# Patient Record
Sex: FEMALE | Race: WHITE | NOT HISPANIC OR LATINO | Employment: FULL TIME | URBAN - METROPOLITAN AREA
[De-identification: names, ages, dates, MRNs, and addresses within clinical notes are randomized per-mention and may not be internally consistent; named-entity substitution may affect disease eponyms.]

---

## 2023-07-12 DIAGNOSIS — Z00.00 ANNUAL PHYSICAL EXAM: ICD-10-CM

## 2023-07-12 DIAGNOSIS — Z12.31 VISIT FOR SCREENING MAMMOGRAM: ICD-10-CM

## 2023-07-12 DIAGNOSIS — R79.0 LOW IRON STORES: ICD-10-CM

## 2023-07-12 ASSESSMENT — PROMIS GLOBAL HEALTH SCALE
RATE_PHYSICAL_HEALTH: VERY GOOD
RATE_AVERAGE_FATIGUE: MODERATE
RATE_GENERAL_HEALTH: VERY GOOD
RATE_SOCIAL_SATISFACTION: VERY GOOD
RATE_QUALITY_OF_LIFE: VERY GOOD
EMOTIONAL_PROBLEMS: SOMETIMES
RATE_MENTAL_HEALTH: VERY GOOD
CARRYOUT_PHYSICAL_ACTIVITIES: COMPLETELY
CARRYOUT_SOCIAL_ACTIVITIES: VERY GOOD
RATE_AVERAGE_PAIN: 0

## 2023-07-13 ENCOUNTER — LAB (OUTPATIENT)
Dept: LAB | Facility: LAB | Age: 48
End: 2023-07-13
Payer: COMMERCIAL

## 2023-07-13 DIAGNOSIS — Z00.00 ANNUAL PHYSICAL EXAM: ICD-10-CM

## 2023-07-13 DIAGNOSIS — R79.0 LOW IRON STORES: ICD-10-CM

## 2023-07-13 LAB
ALANINE AMINOTRANSFERASE (SGPT) (U/L) IN SER/PLAS: 15 U/L (ref 7–45)
ALBUMIN (G/DL) IN SER/PLAS: 4.4 G/DL (ref 3.4–5)
ALKALINE PHOSPHATASE (U/L) IN SER/PLAS: 93 U/L (ref 33–110)
ANION GAP IN SER/PLAS: 15 MMOL/L (ref 10–20)
ASPARTATE AMINOTRANSFERASE (SGOT) (U/L) IN SER/PLAS: 14 U/L (ref 9–39)
BASOPHILS (10*3/UL) IN BLOOD BY AUTOMATED COUNT: 0.05 X10E9/L (ref 0–0.1)
BASOPHILS/100 LEUKOCYTES IN BLOOD BY AUTOMATED COUNT: 0.6 % (ref 0–2)
BILIRUBIN TOTAL (MG/DL) IN SER/PLAS: 0.6 MG/DL (ref 0–1.2)
CALCIUM (MG/DL) IN SER/PLAS: 10.1 MG/DL (ref 8.6–10.6)
CARBON DIOXIDE, TOTAL (MMOL/L) IN SER/PLAS: 28 MMOL/L (ref 21–32)
CHLORIDE (MMOL/L) IN SER/PLAS: 101 MMOL/L (ref 98–107)
CHOLESTEROL (MG/DL) IN SER/PLAS: 194 MG/DL (ref 0–199)
CHOLESTEROL IN HDL (MG/DL) IN SER/PLAS: 52.5 MG/DL
CHOLESTEROL/HDL RATIO: 3.7
CREATININE (MG/DL) IN SER/PLAS: 0.72 MG/DL (ref 0.5–1.05)
EOSINOPHILS (10*3/UL) IN BLOOD BY AUTOMATED COUNT: 0.11 X10E9/L (ref 0–0.7)
EOSINOPHILS/100 LEUKOCYTES IN BLOOD BY AUTOMATED COUNT: 1.4 % (ref 0–6)
ERYTHROCYTE DISTRIBUTION WIDTH (RATIO) BY AUTOMATED COUNT: 13.6 % (ref 11.5–14.5)
ERYTHROCYTE MEAN CORPUSCULAR HEMOGLOBIN CONCENTRATION (G/DL) BY AUTOMATED: 31.6 G/DL (ref 32–36)
ERYTHROCYTE MEAN CORPUSCULAR VOLUME (FL) BY AUTOMATED COUNT: 88 FL (ref 80–100)
ERYTHROCYTES (10*6/UL) IN BLOOD BY AUTOMATED COUNT: 4.68 X10E12/L (ref 4–5.2)
FERRITIN (UG/LL) IN SER/PLAS: 76 UG/L (ref 8–150)
GFR FEMALE: >90 ML/MIN/1.73M2
GLUCOSE (MG/DL) IN SER/PLAS: 110 MG/DL (ref 74–99)
HEMATOCRIT (%) IN BLOOD BY AUTOMATED COUNT: 41.4 % (ref 36–46)
HEMOGLOBIN (G/DL) IN BLOOD: 13.1 G/DL (ref 12–16)
IMMATURE GRANULOCYTES/100 LEUKOCYTES IN BLOOD BY AUTOMATED COUNT: 0.3 % (ref 0–0.9)
IRON (UG/DL) IN SER/PLAS: 104 UG/DL (ref 35–150)
IRON BINDING CAPACITY (UG/DL) IN SER/PLAS: 349 UG/DL (ref 240–445)
IRON SATURATION (%) IN SER/PLAS: 30 % (ref 25–45)
LDL: 107 MG/DL (ref 0–99)
LEUKOCYTES (10*3/UL) IN BLOOD BY AUTOMATED COUNT: 7.9 X10E9/L (ref 4.4–11.3)
LYMPHOCYTES (10*3/UL) IN BLOOD BY AUTOMATED COUNT: 2.55 X10E9/L (ref 1.2–4.8)
LYMPHOCYTES/100 LEUKOCYTES IN BLOOD BY AUTOMATED COUNT: 32.4 % (ref 13–44)
MONOCYTES (10*3/UL) IN BLOOD BY AUTOMATED COUNT: 0.62 X10E9/L (ref 0.1–1)
MONOCYTES/100 LEUKOCYTES IN BLOOD BY AUTOMATED COUNT: 7.9 % (ref 2–10)
NEUTROPHILS (10*3/UL) IN BLOOD BY AUTOMATED COUNT: 4.52 X10E9/L (ref 1.2–7.7)
NEUTROPHILS/100 LEUKOCYTES IN BLOOD BY AUTOMATED COUNT: 57.4 % (ref 40–80)
NRBC (PER 100 WBCS) BY AUTOMATED COUNT: 0 /100 WBC (ref 0–0)
PLATELETS (10*3/UL) IN BLOOD AUTOMATED COUNT: 281 X10E9/L (ref 150–450)
POTASSIUM (MMOL/L) IN SER/PLAS: 3.9 MMOL/L (ref 3.5–5.3)
PROTEIN TOTAL: 7 G/DL (ref 6.4–8.2)
SODIUM (MMOL/L) IN SER/PLAS: 140 MMOL/L (ref 136–145)
THYROTROPIN (MIU/L) IN SER/PLAS BY DETECTION LIMIT <= 0.05 MIU/L: 1.64 MIU/L (ref 0.44–3.98)
TRIGLYCERIDE (MG/DL) IN SER/PLAS: 175 MG/DL (ref 0–149)
UREA NITROGEN (MG/DL) IN SER/PLAS: 10 MG/DL (ref 6–23)
VLDL: 35 MG/DL (ref 0–40)

## 2023-07-13 PROCEDURE — 36415 COLL VENOUS BLD VENIPUNCTURE: CPT

## 2023-07-13 PROCEDURE — 83540 ASSAY OF IRON: CPT

## 2023-07-13 PROCEDURE — 83550 IRON BINDING TEST: CPT

## 2023-07-13 PROCEDURE — 84443 ASSAY THYROID STIM HORMONE: CPT

## 2023-07-13 PROCEDURE — 82728 ASSAY OF FERRITIN: CPT

## 2023-07-13 PROCEDURE — 80053 COMPREHEN METABOLIC PANEL: CPT

## 2023-07-13 PROCEDURE — 80061 LIPID PANEL: CPT

## 2023-07-13 PROCEDURE — 85025 COMPLETE CBC W/AUTO DIFF WBC: CPT

## 2023-07-19 ENCOUNTER — OFFICE VISIT (OUTPATIENT)
Dept: PRIMARY CARE | Facility: CLINIC | Age: 48
End: 2023-07-19
Payer: COMMERCIAL

## 2023-07-19 VITALS
DIASTOLIC BLOOD PRESSURE: 70 MMHG | TEMPERATURE: 97.2 F | HEART RATE: 68 BPM | BODY MASS INDEX: 24.98 KG/M2 | RESPIRATION RATE: 16 BRPM | HEIGHT: 63 IN | SYSTOLIC BLOOD PRESSURE: 120 MMHG | WEIGHT: 141 LBS

## 2023-07-19 DIAGNOSIS — E78.5 DYSLIPIDEMIA: ICD-10-CM

## 2023-07-19 DIAGNOSIS — R21 RASH: ICD-10-CM

## 2023-07-19 DIAGNOSIS — Z00.00 ROUTINE GENERAL MEDICAL EXAMINATION AT A HEALTH CARE FACILITY: Primary | ICD-10-CM

## 2023-07-19 DIAGNOSIS — F41.9 ANXIETY: ICD-10-CM

## 2023-07-19 PROBLEM — N95.1 MENOPAUSAL SYMPTOMS: Status: ACTIVE | Noted: 2023-07-19

## 2023-07-19 PROCEDURE — 99396 PREV VISIT EST AGE 40-64: CPT | Performed by: INTERNAL MEDICINE

## 2023-07-19 PROCEDURE — 1036F TOBACCO NON-USER: CPT | Performed by: INTERNAL MEDICINE

## 2023-07-19 RX ORDER — PANTOPRAZOLE SODIUM 40 MG/1
40 TABLET, DELAYED RELEASE ORAL
COMMUNITY
Start: 2022-08-18

## 2023-07-19 RX ORDER — MOMETASONE FUROATE 1 MG/G
OINTMENT TOPICAL DAILY
Qty: 45 G | Refills: 0 | Status: SHIPPED | OUTPATIENT
Start: 2023-07-19 | End: 2024-01-19 | Stop reason: ALTCHOICE

## 2023-07-19 RX ORDER — ALPRAZOLAM 0.25 MG/1
TABLET ORAL
Qty: 6 TABLET | Refills: 0 | Status: SHIPPED | OUTPATIENT
Start: 2023-07-19 | End: 2024-03-07 | Stop reason: SDUPTHER

## 2023-07-19 RX ORDER — PAROXETINE 7.5 MG/1
7.5 CAPSULE ORAL
COMMUNITY
Start: 2023-06-23 | End: 2024-06-22

## 2023-07-19 ASSESSMENT — ENCOUNTER SYMPTOMS: DEPRESSION: 0

## 2023-07-19 ASSESSMENT — COLUMBIA-SUICIDE SEVERITY RATING SCALE - C-SSRS
1. IN THE PAST MONTH, HAVE YOU WISHED YOU WERE DEAD OR WISHED YOU COULD GO TO SLEEP AND NOT WAKE UP?: NO
2. HAVE YOU ACTUALLY HAD ANY THOUGHTS OF KILLING YOURSELF?: NO
6. HAVE YOU EVER DONE ANYTHING, STARTED TO DO ANYTHING, OR PREPARED TO DO ANYTHING TO END YOUR LIFE?: NO

## 2023-07-19 ASSESSMENT — PATIENT HEALTH QUESTIONNAIRE - PHQ9
1. LITTLE INTEREST OR PLEASURE IN DOING THINGS: NOT AT ALL
SUM OF ALL RESPONSES TO PHQ9 QUESTIONS 1 AND 2: 0
2. FEELING DOWN, DEPRESSED OR HOPELESS: NOT AT ALL

## 2023-07-19 NOTE — PROGRESS NOTES
Subjective   Patient ID: Elizabeth Coto is a 48 y.o. female who presents for Annual Exam (BW results ).  HPI  Patient is here for annual check-up    Last well check 1 yr    Reported health [facility]    Dental  check  reg     Vision check reg   Vision issues contact/glasses  Hearing issues n  Vaccines UTD  y    Diet healthy but could improve  is vegetarian  Exercise less now usually 3 x  Caffeine  1 c   Alcohol occ  Tobacco never     Colon cancer screening  colonoscopy due in 8/2023  Prev had upper and lower for low iron    Current issues: Patient is here for annual checkup.  She has been feeling well.  Her biggest concern is heart disease risk factors as her dad had several MIs as well as carotid stenosis and stroke.  He passed away in his 60s.  He is initially started having heart issues in his early 30s.  Patient is a non-smoker she had recent labs to go over    She also has concerns about hormonal symptoms she was given a prescription for Brisdelle and she wanted to know about that if I thought that was a good idea      She has a rash on her abdomen for about 4 to 5 days it is very itchy and a little bit blistery there is no pain  Review of Systems  GENERAL - Denies fever, fatigue or chills  SKIN -see above   EYES - Denies blurred vision, or change in visual acuity  EARS - Denies ear pain, discharge, ringing, or difficulty hearing  NOSE - Denies nasal congestion, discharge, or bleeding  MOUTH - Denies sore throat, postnasal drip or painful/difficulty swallowing  NECK - Denies pain or swelling  RESPIRATORY - Denies shortness of breath, cough, wheezing  CARDIOVASCULAR - Denies palpitations, chest pain, orthopnea, peripheral edema, syncope or claudication  GASTROINTESTINAL - Denies nausea, vomiting, diarrhea, constipation, abdominal pain, melena and or bright red blood  GENITOURINARY - Denies dysuria, frequency of urination, urgency, or hesitancy  MUSCULOSKELETAL - Denies joint or muscle pain, or back pain  NEUROLOGICAL -  "Denies localized numbness, weakness, or tingling  PSYCHIATRIC - Denies depression, anxiety, substance abuse, suicidal or homicidal ideation  ENDOCRINE - Denies heat or cold intolerance, weight loss or gain, increasing thirst  HEMATO-IMMUNOLOGIC - Denies easy bruising, bleeding, oral ulcerations or recurrent infections    Objective   /70   Pulse 68   Temp 36.2 °C (97.2 °F)   Resp 16   Ht 1.6 m (5' 3\")   Wt 64 kg (141 lb)   BMI 24.98 kg/m²     Physical Exam  CONSTITUTIONAL - well nourished, well developed, looks like stated age, in no acute distress, not ill-appearing, and not tired appearing  SKIN -she has a red circular lesion with some internal blisters on her mid abdomen upper aspect   HEAD - no trauma, normocephalic  EYES -normal  ENT - TM's intact, no injection, no exudate,  nasal passage without discharge and patent  NECK - supple without rigidity, no neck mass was observed, no thyromegaly or thyroid nodules  CHEST - clear to auscultation, no wheezing, no crackles and no rales, good effort  CARDIAC - regular rate and regular rhythm, no skipped beats, no murmur  ABDOMEN - no organomegaly, soft, nontender, nondistended, normal bowel sounds, no guarding/rebound/rigidity  EXTREMITIES - no edema, no deformities  NEUROLOGICAL -normal  PSYCHIATRIC - alert, pleasant and cordial, age-appropriate  LYMPHATIC- no cervical lymphadenopathy    Assessment/Plan   Diagnoses and all orders for this visit:  Routine general medical examination at a health care facility  Dyslipidemia  -     Comprehensive Metabolic Panel; Future  -     Lipid Panel; Future  Anxiety  -     ALPRAZolam (Xanax) 0.25 mg tablet; 1 to 2 tab po  1 hour before flight  Rash  -     mometasone (Elocon) 0.1 % ointment; Apply topically once daily for 21 days. apply to affected area    Call with issues  Follow-up with me in 6 months recheck blood work at that time  Patient to work on diet and exercise  The rash I suspect may be early poison ivy after " mometasone prescription she is instructed to call if she develops any pain  Lorena Tate MD

## 2024-01-12 ENCOUNTER — LAB (OUTPATIENT)
Dept: LAB | Facility: LAB | Age: 49
End: 2024-01-12
Payer: COMMERCIAL

## 2024-01-12 DIAGNOSIS — E78.5 DYSLIPIDEMIA: ICD-10-CM

## 2024-01-12 LAB
ALBUMIN SERPL BCP-MCNC: 4.5 G/DL (ref 3.4–5)
ALP SERPL-CCNC: 95 U/L (ref 33–110)
ALT SERPL W P-5'-P-CCNC: 25 U/L (ref 7–45)
ANION GAP SERPL CALC-SCNC: 15 MMOL/L (ref 10–20)
AST SERPL W P-5'-P-CCNC: 15 U/L (ref 9–39)
BILIRUB SERPL-MCNC: 0.4 MG/DL (ref 0–1.2)
BUN SERPL-MCNC: 11 MG/DL (ref 6–23)
CALCIUM SERPL-MCNC: 10.1 MG/DL (ref 8.6–10.6)
CHLORIDE SERPL-SCNC: 104 MMOL/L (ref 98–107)
CHOLEST SERPL-MCNC: 185 MG/DL (ref 0–199)
CHOLESTEROL/HDL RATIO: 3.9
CO2 SERPL-SCNC: 29 MMOL/L (ref 21–32)
CREAT SERPL-MCNC: 0.76 MG/DL (ref 0.5–1.05)
EGFRCR SERPLBLD CKD-EPI 2021: >90 ML/MIN/1.73M*2
GLUCOSE SERPL-MCNC: 121 MG/DL (ref 74–99)
HDLC SERPL-MCNC: 47 MG/DL
LDLC SERPL CALC-MCNC: 111 MG/DL
NON HDL CHOLESTEROL: 138 MG/DL (ref 0–149)
POTASSIUM SERPL-SCNC: 4.7 MMOL/L (ref 3.5–5.3)
PROT SERPL-MCNC: 7 G/DL (ref 6.4–8.2)
SODIUM SERPL-SCNC: 143 MMOL/L (ref 136–145)
TRIGL SERPL-MCNC: 136 MG/DL (ref 0–149)
VLDL: 27 MG/DL (ref 0–40)

## 2024-01-12 PROCEDURE — 80061 LIPID PANEL: CPT

## 2024-01-12 PROCEDURE — 36415 COLL VENOUS BLD VENIPUNCTURE: CPT

## 2024-01-12 PROCEDURE — 80053 COMPREHEN METABOLIC PANEL: CPT

## 2024-01-19 ENCOUNTER — OFFICE VISIT (OUTPATIENT)
Dept: PRIMARY CARE | Facility: CLINIC | Age: 49
End: 2024-01-19
Payer: COMMERCIAL

## 2024-01-19 ENCOUNTER — LAB (OUTPATIENT)
Dept: LAB | Facility: LAB | Age: 49
End: 2024-01-19
Payer: COMMERCIAL

## 2024-01-19 VITALS
HEART RATE: 76 BPM | TEMPERATURE: 97.6 F | DIASTOLIC BLOOD PRESSURE: 76 MMHG | HEIGHT: 63 IN | OXYGEN SATURATION: 95 % | SYSTOLIC BLOOD PRESSURE: 116 MMHG | BODY MASS INDEX: 25.16 KG/M2 | WEIGHT: 142 LBS

## 2024-01-19 DIAGNOSIS — R73.01 FASTING HYPERGLYCEMIA: Primary | ICD-10-CM

## 2024-01-19 DIAGNOSIS — R73.01 FASTING HYPERGLYCEMIA: ICD-10-CM

## 2024-01-19 DIAGNOSIS — R73.03 BORDERLINE DIABETES MELLITUS: Primary | ICD-10-CM

## 2024-01-19 DIAGNOSIS — Z00.00 ROUTINE GENERAL MEDICAL EXAMINATION AT A HEALTH CARE FACILITY: ICD-10-CM

## 2024-01-19 LAB
EST. AVERAGE GLUCOSE BLD GHB EST-MCNC: 137 MG/DL
HBA1C MFR BLD: 6.4 %

## 2024-01-19 PROCEDURE — 83036 HEMOGLOBIN GLYCOSYLATED A1C: CPT

## 2024-01-19 PROCEDURE — 99214 OFFICE O/P EST MOD 30 MIN: CPT | Performed by: INTERNAL MEDICINE

## 2024-01-19 PROCEDURE — 1036F TOBACCO NON-USER: CPT | Performed by: INTERNAL MEDICINE

## 2024-01-19 PROCEDURE — 36415 COLL VENOUS BLD VENIPUNCTURE: CPT

## 2024-01-19 RX ORDER — BLOOD SUGAR DIAGNOSTIC
1 STRIP MISCELLANEOUS DAILY
Qty: 100 STRIP | Refills: 1 | Status: SHIPPED | OUTPATIENT
Start: 2024-01-19

## 2024-01-19 RX ORDER — LANCETS
EACH MISCELLANEOUS
Qty: 100 EACH | Refills: 1 | Status: SHIPPED | OUTPATIENT
Start: 2024-01-19

## 2024-01-19 RX ORDER — INSULIN PUMP SYRINGE, 3 ML
EACH MISCELLANEOUS
Qty: 1 EACH | Refills: 0 | Status: SHIPPED | OUTPATIENT
Start: 2024-01-19 | End: 2025-01-18

## 2024-01-19 ASSESSMENT — PATIENT HEALTH QUESTIONNAIRE - PHQ9
SUM OF ALL RESPONSES TO PHQ9 QUESTIONS 1 AND 2: 0
2. FEELING DOWN, DEPRESSED OR HOPELESS: NOT AT ALL
1. LITTLE INTEREST OR PLEASURE IN DOING THINGS: NOT AT ALL

## 2024-01-19 ASSESSMENT — ENCOUNTER SYMPTOMS
DEPRESSION: 0
LOSS OF SENSATION IN FEET: 0
OCCASIONAL FEELINGS OF UNSTEADINESS: 0

## 2024-01-19 NOTE — PROGRESS NOTES
"Subjective   Patient ID: Elizabeth Coto is a 48 y.o. female who presents for Follow-up (EP.  Follow up.  Labs done.).  HPI  Patient presents today for follow-up appointment she had blood work done We have been watching her sugar.  Her sugar is elevated.  She also had cholesterol rechecked her cholesterol is overall good but her triglycerides are high.  She states she knows she needs to work on diet and exercise.  Will get had a hemoglobin A1c on her blood work today.  She continues on pantoprazole for acid reflux as well as Paxil at very low-dose prescribed by her GYN for gynecologic symptoms.  She states that she knows she needs to improve her lifestyle because she has family history of diabetes.    Review of Systems  Review of systems was performed and is otherwise negative except as noted in HPI.  Objective   /76   Pulse 76   Temp 36.4 °C (97.6 °F) (Oral)   Ht 1.6 m (5' 3\")   Wt 64.4 kg (142 lb)   SpO2 95%   BMI 25.15 kg/m²    Physical Exam  HEENT is normal  Lungs clear bilaterally  Heart is regular rate rhythm no murmurs  Abdomen benign  Lower extremities no edema    Assessment/Plan   Diagnoses and all orders for this visit:  Fasting hyperglycemia  -     Hemoglobin A1C; Future  -     Referral to Nutrition Services; Future  Routine general medical examination at a health care facility  -     Follow Up In [facility] - PCP - Health Maintenance; Future  Worsened glycemic control  Acid reflux is under good control  Discussed diet and exercise.  She will need to follow-up with me in 6 months  Referring to nutrition.  We did her hemoglobin A1c.  It resulted before this note was written.  Patient is aware she is contact me.  She is going to defer on metformin for now she is going to start testing her sugars and see nutrition.  She will call me with any issues.  She will follow-up with me in 3 months instead of 6 months.  She is also doing a 6-month follow-up for her annual physical.  Blood work is " entered for her next appointment.  She will call if she has any issues with hyperglycemia.  She will try to follow the recommendations of nutrition  Lorena Tate MD    SBO

## 2024-01-26 ENCOUNTER — NUTRITION (OUTPATIENT)
Dept: NUTRITION | Facility: CLINIC | Age: 49
End: 2024-01-26
Payer: COMMERCIAL

## 2024-01-26 DIAGNOSIS — Z71.3 DIETARY COUNSELING AND SURVEILLANCE: Primary | ICD-10-CM

## 2024-01-26 DIAGNOSIS — R73.01 FASTING HYPERGLYCEMIA: ICD-10-CM

## 2024-01-26 PROCEDURE — 97802 MEDICAL NUTRITION INDIV IN: CPT

## 2024-01-26 NOTE — PROGRESS NOTES
"NUTRITION ASSESSMENT NOTE    Reason for Nutrition Visit:  Pt is a 48 y.o. female being seen in person for an initial appointment at [facility] referred for fasting hyperglycemia.      Pt states they seek  from dietitian for help addressing her high blood sugar levels and learning how to eat healthier in general.     Anthropometrics:  Wt: 142#, 64.4kg  Ht: 5'3\"  BMI: 25.15 kg/m2      Past Medical Hx:  Patient Active Problem List   Diagnosis    Menopausal symptoms     - IBS      Lab Results   Component Value Date    HGBA1C 6.4 (H) 01/19/2024    CHOL 185 01/12/2024    LDLF 107 (H) 07/13/2023    TRIG 136 01/12/2024          Food and Nutrition Hx:  Pt saw a dietitian ~19 years ago when she was pregnant with her son when she also had gestational diabetes. Pt has strong genetic component with regard to blood sugars. Pt wants to get it under control before heading down the pharmaceutical route.     Pt has been a vegetarian since the age of 16 - she cites not being a \"healthy vegetarian,\" but more of a \"lazy vegetarian.\"    Pt feels biggest downfall is the improper spacing of her meals and the lack of meal planning and prepping. Pt is a , part time, and now has the time to manage her meal planning and prepping. Pt does not eat the same food as her , thus she eats differently.     Pt has tried [employer], Hello Fresh, Daily [place], and other meal delivery services. Pt feels like these offerings have too many carbohydrates within them.     DIETARY RECALL: *Pt consumes an average of 3 meals/day*  Meal 1: Everyday, ~10am, [place]ita biscuits  Meal 2: Everyday, varied timing ~~1pm-3pm, Peanut and jelly sandwich, pre-packaged salads, snacks (cheese stick and crackers, red peppers)  Meal 3: Everyday, varied timing (when hungry), Pasta, veggie burgers, tofu (tacos, stir gaming), vegetables (corn, potatoes, starchy varieties), brussels sprouts  Snacks: Nuts, trail mix  Beverages: Water x 32oz+ daily, Diet " Dr. Pepper x 1 daily      NUTRITIONAL ARTIFACTS:  Trying new restaurants to try new vegetarian dishes to help create at home -- not opposed to trying fish -- avoids dairy products d/t intolerance -- does not drink much water early in the day d/t work confines    Allergies: None  Intolerance: Lactose    Supplements: Multivitamin, Vitamin D, Calcium, and Iron daily    Energy Levels: Stable    Food Preparation: Patient  Cooking Skills/Barriers: Limited cooking skills and Low preference for cooking     Nutrition Focused Physical Exam:    Performed/Deferred: Deferred as pt visually appears well-nourished with no signs of malnutrition      Estimated Energy Needs:    WEIGHT MAINTENANCE: 30 kcal/kg CBW = 1900 kcal/day  WEIGHT LOSS: 22-25 kcal/kg CBW = 1142-4798 kcal/day  PROTEIN Needs: 0.8-1 g/kg = 50-65 g/day    Nutrition Diagnosis:    Diagnosis Statement 1:  Diagnosis Status: New  Diagnosis : Altered nutrition related lab values  related to  endocrine and/or metabolic dysfunction  as evidenced by  patient HbA1C of 6.4% and LDL of 111 mg/dL    Diagnosis Statement 2:  Diagnosis Status: New  Diagnosis : Food and nutrition related knowledge deficit related to lack of or limited prior nutrition-related education as evidenced by verbalizes inaccurate or incomplete knowledge    Diagnosis Statement 3:   Diagnosis Status: New  Diagnosis : Inadequate protein intake  related to food and nutrition related knowledge deficit concerning appropriate amount and type of dietary fat and/or protein as evidenced by  estimated daily intake of protein at levels <75% daily recommended needs    Nutrition Interventions:  Anti-Inflammatory Diet, Consistent Carbohydrate Diet, Decreased Carbohydrate Diet, Increased Fiber Diet, Increased Fluid Intake, Increased Soluble Fiber, Increased Protein Diet, Mediterranean Diet, and Plant Based Diet  Nutrition Counseling: Motivational Interviewing and Health Belief Model  Coordination of Care:  None      Nutrition Recommendations:  1) Cut current volume of starchy and refined carbohydrates down to portioned levels discussed during appointment. Remember, carbohydrates are essential to providing the body with energy and nutrients and they should not be avoided with diabetes or other conditions. It becomes extremely important to manage the portion control/volume of such carbohydrates, and the timing of eating them. Breakfast should be consumed within 1-2 hours of waking, lunch 4-5 hours after breakfast, and dinner 4-5 hours after lunch. A healthy and balanced snack can be eaten in the evening, and one snack can be eaten between one of your meals.  Eating consistently, and with measured portions, carbohydrates will help manage blood sugar levels and help reduce excessive hunger in the evenings.   2) Consume balanced meals - breakfast, lunch, dinner - to promote management of blood sugar levels, management of metabolism, and promote satiety. Use the My Plate handout from the appointment as a tool to ensure that each meal, and snack, contains fiber-rich carbohydrates, lean proteins, and fibrous vegetables. When combined, these types of foods can help slow down digestion and absorption, thereby helping manage excessive rises in blood sugar levels.  3) Begin adding breakfast into your daily routine. Establishing this meal will help lay the foundation for eating three meals a day, something that will help manage your blood sugar and reduce your A1C level. Remember, it is about consistency and control, especially for portions of carbohydrates.  4) Increase fibrous vegetable intake. Women are recommended to consume at least 25 grams of fiber daily. Plants, such as vegetables, fruits, whole grains, legumes, nuts, and seeds will help reduce inflammation in your GI tract, help repair and restore lining and integrity of GI tract, manage blood sugar levels, reduce cholesterol and lipid levels, enhance metabolic  "functioning, improve weight status, and promote satiety. Fiber from plants does not count as calories, and is considered extremely healthful.  5) Begin building and incorporating \"Nourish Bowls\" into your dietary patterning. This powerful phytonutrient bowl helps deliver an exceptional array of phytonutrients, vitamins, minerals, lean protein, fiber-rich carbohydrates, heart healthy fats, and immune boosting antioxidants. You can eat the nourish bowls for lunch as an excellent way to promote nutrient intake midday, or you can eat them in the evening to promote lasting satiety that prevents overconsumption of snacks or other foods at night.    Educational Handouts: ADA My Plate, PBP,     Readiness to Change : Excellent  Level of Understanding : Excellent  Anticipated Compliant : Excellent    "

## 2024-03-07 DIAGNOSIS — F41.9 ANXIETY: ICD-10-CM

## 2024-03-07 RX ORDER — ALPRAZOLAM 0.25 MG/1
TABLET ORAL
Qty: 6 TABLET | Refills: 0 | Status: SHIPPED | OUTPATIENT
Start: 2024-03-07

## 2024-03-07 NOTE — TELEPHONE ENCOUNTER
----- Message from Elizabeth Coto sent at 3/6/2024  9:37 PM EST -----  Regarding: Medication refill   Contact: [phone]  Hello- I would like to request a refill on my prescription for Xanax (alprazolam .25)  It was prescribed last summer to help with my fear of flying. (& helped tremendously) I  have 2 trips coming up (domestic) & was hoping for help.   Thank you!

## 2024-03-07 NOTE — TELEPHONE ENCOUNTER
Patient called requesting a new rx for the alprazolam, she stated it helped tremendously when she is flying and she has two trips coming up.     Last appt 01/19/24  Next appt 04/26/24

## 2024-04-19 ENCOUNTER — LAB (OUTPATIENT)
Dept: LAB | Facility: LAB | Age: 49
End: 2024-04-19
Payer: COMMERCIAL

## 2024-04-19 DIAGNOSIS — R73.03 BORDERLINE DIABETES MELLITUS: ICD-10-CM

## 2024-04-19 LAB
EST. AVERAGE GLUCOSE BLD GHB EST-MCNC: 123 MG/DL
HBA1C MFR BLD: 5.9 %

## 2024-04-19 PROCEDURE — 80061 LIPID PANEL: CPT

## 2024-04-19 PROCEDURE — 36415 COLL VENOUS BLD VENIPUNCTURE: CPT

## 2024-04-19 PROCEDURE — 80053 COMPREHEN METABOLIC PANEL: CPT

## 2024-04-19 PROCEDURE — 83036 HEMOGLOBIN GLYCOSYLATED A1C: CPT

## 2024-04-20 LAB
ALBUMIN SERPL BCP-MCNC: 4.3 G/DL (ref 3.4–5)
ALP SERPL-CCNC: 91 U/L (ref 33–110)
ALT SERPL W P-5'-P-CCNC: 12 U/L (ref 7–45)
ANION GAP SERPL CALC-SCNC: 12 MMOL/L (ref 10–20)
AST SERPL W P-5'-P-CCNC: 11 U/L (ref 9–39)
BILIRUB SERPL-MCNC: 0.4 MG/DL (ref 0–1.2)
BUN SERPL-MCNC: 9 MG/DL (ref 6–23)
CALCIUM SERPL-MCNC: 9.8 MG/DL (ref 8.6–10.6)
CHLORIDE SERPL-SCNC: 103 MMOL/L (ref 98–107)
CHOLEST SERPL-MCNC: 167 MG/DL (ref 0–199)
CHOLESTEROL/HDL RATIO: 3.1
CO2 SERPL-SCNC: 29 MMOL/L (ref 21–32)
CREAT SERPL-MCNC: 0.76 MG/DL (ref 0.5–1.05)
EGFRCR SERPLBLD CKD-EPI 2021: >90 ML/MIN/1.73M*2
GLUCOSE SERPL-MCNC: 106 MG/DL (ref 74–99)
HDLC SERPL-MCNC: 53.5 MG/DL
LDLC SERPL CALC-MCNC: 95 MG/DL
NON HDL CHOLESTEROL: 114 MG/DL (ref 0–149)
POTASSIUM SERPL-SCNC: 4.1 MMOL/L (ref 3.5–5.3)
PROT SERPL-MCNC: 6.9 G/DL (ref 6.4–8.2)
SODIUM SERPL-SCNC: 140 MMOL/L (ref 136–145)
TRIGL SERPL-MCNC: 94 MG/DL (ref 0–149)
VLDL: 19 MG/DL (ref 0–40)

## 2024-04-26 ENCOUNTER — OFFICE VISIT (OUTPATIENT)
Dept: PRIMARY CARE | Facility: CLINIC | Age: 49
End: 2024-04-26
Payer: COMMERCIAL

## 2024-04-26 VITALS
OXYGEN SATURATION: 96 % | HEIGHT: 63 IN | BODY MASS INDEX: 21.79 KG/M2 | TEMPERATURE: 97.9 F | SYSTOLIC BLOOD PRESSURE: 114 MMHG | DIASTOLIC BLOOD PRESSURE: 74 MMHG | HEART RATE: 65 BPM | WEIGHT: 123 LBS

## 2024-04-26 DIAGNOSIS — R73.01 FASTING HYPERGLYCEMIA: Primary | ICD-10-CM

## 2024-04-26 DIAGNOSIS — Z00.00 ROUTINE GENERAL MEDICAL EXAMINATION AT A HEALTH CARE FACILITY: ICD-10-CM

## 2024-04-26 PROCEDURE — 99213 OFFICE O/P EST LOW 20 MIN: CPT | Performed by: INTERNAL MEDICINE

## 2024-04-26 PROCEDURE — 1036F TOBACCO NON-USER: CPT | Performed by: INTERNAL MEDICINE

## 2024-04-26 ASSESSMENT — ENCOUNTER SYMPTOMS
OCCASIONAL FEELINGS OF UNSTEADINESS: 0
LOSS OF SENSATION IN FEET: 0
DEPRESSION: 0

## 2024-04-26 ASSESSMENT — PATIENT HEALTH QUESTIONNAIRE - PHQ9
2. FEELING DOWN, DEPRESSED OR HOPELESS: NOT AT ALL
1. LITTLE INTEREST OR PLEASURE IN DOING THINGS: NOT AT ALL
SUM OF ALL RESPONSES TO PHQ9 QUESTIONS 1 AND 2: 0

## 2024-04-26 NOTE — PROGRESS NOTES
"Subjective   Patient ID: Elizabeth Coto is a 48 y.o. female who presents for Follow-up (EP.  Follow up.  Labs done.  No concerns.).  HPI  Here for  sugar   Had bw and numbers were better    Would like to touch base w nutrition  again   She is pleased with the progress of her sugar.  She feels like she would like retesting nutrition now that she is work on it for about 3 months I want a more specific ideas about carb counting.  She is exercising regularly.  She is feeling well.  She is pleased with her numbers.  She continues on her other medications as noted.  She has no other issues.    Patient denies excessive urination thirst or weight loss.  She has had no weight gain.  She has no chest pains headaches dizziness lightheadedness or shortness of breath and denies lower extremity edema      Review of Systems  Review of systems was performed and is otherwise negative except as noted in HPI.  Objective   /74   Pulse 65   Temp 36.6 °C (97.9 °F) (Oral)   Ht 1.6 m (5' 3\")   Wt 55.8 kg (123 lb)   SpO2 96%   BMI 21.79 kg/m²    Physical Exam  HEENT is normal  Lungs clear bilaterally  Heart is regular rate rhythm no murmurs  Abdomen benign  Lower extremities no edema    Assessment/Plan   Diagnoses and all orders for this visit:  Fasting hyperglycemia  -     Referral to Nutrition Services; Future  Routine general medical examination at a health care facility  -     CBC and Auto Differential; Future  -     Comprehensive Metabolic Panel; Future  -     Lipid Panel; Future  -     TSH with reflex to Free T4 if abnormal; Future  -     Hemoglobin A1C; Future  -     Iron and TIBC; Future  -     Ferritin; Future  -     Vitamin D 25-Hydroxy,Total (for eval of Vitamin D levels); Future    Call with issues  Blood work ordered  Follow-up with me in 3 months for her annual physical  Referred back to nutrition  She will call with issues  Lorena Tate MD   "

## 2024-07-19 ENCOUNTER — LAB (OUTPATIENT)
Dept: LAB | Facility: LAB | Age: 49
End: 2024-07-19
Payer: COMMERCIAL

## 2024-07-19 DIAGNOSIS — Z00.00 ROUTINE GENERAL MEDICAL EXAMINATION AT A HEALTH CARE FACILITY: ICD-10-CM

## 2024-07-19 LAB
25(OH)D3 SERPL-MCNC: 61 NG/ML (ref 30–100)
ALBUMIN SERPL BCP-MCNC: 4.4 G/DL (ref 3.4–5)
ALP SERPL-CCNC: 80 U/L (ref 33–110)
ALT SERPL W P-5'-P-CCNC: 14 U/L (ref 7–45)
ANION GAP SERPL CALC-SCNC: 15 MMOL/L (ref 10–20)
AST SERPL W P-5'-P-CCNC: 16 U/L (ref 9–39)
BASOPHILS # BLD AUTO: 0.06 X10*3/UL (ref 0–0.1)
BASOPHILS NFR BLD AUTO: 0.8 %
BILIRUB SERPL-MCNC: 0.6 MG/DL (ref 0–1.2)
BUN SERPL-MCNC: 12 MG/DL (ref 6–23)
CALCIUM SERPL-MCNC: 9.8 MG/DL (ref 8.6–10.6)
CHLORIDE SERPL-SCNC: 102 MMOL/L (ref 98–107)
CHOLEST SERPL-MCNC: 172 MG/DL (ref 0–199)
CHOLESTEROL/HDL RATIO: 2.7
CO2 SERPL-SCNC: 28 MMOL/L (ref 21–32)
CREAT SERPL-MCNC: 0.76 MG/DL (ref 0.5–1.05)
EGFRCR SERPLBLD CKD-EPI 2021: >90 ML/MIN/1.73M*2
EOSINOPHIL # BLD AUTO: 0.06 X10*3/UL (ref 0–0.7)
EOSINOPHIL NFR BLD AUTO: 0.8 %
ERYTHROCYTE [DISTWIDTH] IN BLOOD BY AUTOMATED COUNT: 14.1 % (ref 11.5–14.5)
EST. AVERAGE GLUCOSE BLD GHB EST-MCNC: 134 MG/DL
FERRITIN SERPL-MCNC: 73 NG/ML (ref 8–150)
GLUCOSE SERPL-MCNC: 104 MG/DL (ref 74–99)
HBA1C MFR BLD: 6.3 %
HCT VFR BLD AUTO: 44.2 % (ref 36–46)
HDLC SERPL-MCNC: 62.6 MG/DL
HGB BLD-MCNC: 14 G/DL (ref 12–16)
IMM GRANULOCYTES # BLD AUTO: 0.02 X10*3/UL (ref 0–0.7)
IMM GRANULOCYTES NFR BLD AUTO: 0.3 % (ref 0–0.9)
IRON SATN MFR SERPL: 25 % (ref 25–45)
IRON SERPL-MCNC: 87 UG/DL (ref 35–150)
LDLC SERPL CALC-MCNC: 95 MG/DL
LYMPHOCYTES # BLD AUTO: 2.57 X10*3/UL (ref 1.2–4.8)
LYMPHOCYTES NFR BLD AUTO: 33.8 %
MCH RBC QN AUTO: 27.4 PG (ref 26–34)
MCHC RBC AUTO-ENTMCNC: 31.7 G/DL (ref 32–36)
MCV RBC AUTO: 87 FL (ref 80–100)
MONOCYTES # BLD AUTO: 0.5 X10*3/UL (ref 0.1–1)
MONOCYTES NFR BLD AUTO: 6.6 %
NEUTROPHILS # BLD AUTO: 4.4 X10*3/UL (ref 1.2–7.7)
NEUTROPHILS NFR BLD AUTO: 57.7 %
NON HDL CHOLESTEROL: 109 MG/DL (ref 0–149)
NRBC BLD-RTO: 0 /100 WBCS (ref 0–0)
PLATELET # BLD AUTO: 295 X10*3/UL (ref 150–450)
POTASSIUM SERPL-SCNC: 4.1 MMOL/L (ref 3.5–5.3)
PROT SERPL-MCNC: 6.8 G/DL (ref 6.4–8.2)
RBC # BLD AUTO: 5.11 X10*6/UL (ref 4–5.2)
SODIUM SERPL-SCNC: 141 MMOL/L (ref 136–145)
TIBC SERPL-MCNC: 349 UG/DL (ref 240–445)
TRIGL SERPL-MCNC: 70 MG/DL (ref 0–149)
TSH SERPL-ACNC: 1.62 MIU/L (ref 0.44–3.98)
UIBC SERPL-MCNC: 262 UG/DL (ref 110–370)
VLDL: 14 MG/DL (ref 0–40)
WBC # BLD AUTO: 7.6 X10*3/UL (ref 4.4–11.3)

## 2024-07-19 PROCEDURE — 82728 ASSAY OF FERRITIN: CPT

## 2024-07-19 PROCEDURE — 80053 COMPREHEN METABOLIC PANEL: CPT

## 2024-07-19 PROCEDURE — 85025 COMPLETE CBC W/AUTO DIFF WBC: CPT

## 2024-07-19 PROCEDURE — 83550 IRON BINDING TEST: CPT

## 2024-07-19 PROCEDURE — 83540 ASSAY OF IRON: CPT

## 2024-07-19 PROCEDURE — 80061 LIPID PANEL: CPT

## 2024-07-19 PROCEDURE — 83036 HEMOGLOBIN GLYCOSYLATED A1C: CPT

## 2024-07-19 PROCEDURE — 36415 COLL VENOUS BLD VENIPUNCTURE: CPT

## 2024-07-19 PROCEDURE — 82306 VITAMIN D 25 HYDROXY: CPT

## 2024-07-19 PROCEDURE — 84443 ASSAY THYROID STIM HORMONE: CPT

## 2024-07-22 ENCOUNTER — APPOINTMENT (OUTPATIENT)
Dept: PRIMARY CARE | Facility: CLINIC | Age: 49
End: 2024-07-22
Payer: COMMERCIAL

## 2024-07-22 VITALS
HEIGHT: 63 IN | BODY MASS INDEX: 22.68 KG/M2 | SYSTOLIC BLOOD PRESSURE: 112 MMHG | WEIGHT: 128 LBS | HEART RATE: 84 BPM | DIASTOLIC BLOOD PRESSURE: 72 MMHG | TEMPERATURE: 98.3 F | OXYGEN SATURATION: 98 %

## 2024-07-22 DIAGNOSIS — E78.5 DYSLIPIDEMIA: Primary | ICD-10-CM

## 2024-07-22 DIAGNOSIS — R73.01 FASTING HYPERGLYCEMIA: ICD-10-CM

## 2024-07-22 PROCEDURE — 1036F TOBACCO NON-USER: CPT | Performed by: INTERNAL MEDICINE

## 2024-07-22 PROCEDURE — 3008F BODY MASS INDEX DOCD: CPT | Performed by: INTERNAL MEDICINE

## 2024-07-22 PROCEDURE — 99396 PREV VISIT EST AGE 40-64: CPT | Performed by: INTERNAL MEDICINE

## 2024-07-22 RX ORDER — METFORMIN HYDROCHLORIDE 500 MG/1
500 TABLET, EXTENDED RELEASE ORAL
Qty: 90 TABLET | Refills: 0 | Status: SHIPPED | OUTPATIENT
Start: 2024-07-22 | End: 2024-10-20

## 2024-07-22 ASSESSMENT — ENCOUNTER SYMPTOMS
OCCASIONAL FEELINGS OF UNSTEADINESS: 0
DEPRESSION: 0
LOSS OF SENSATION IN FEET: 0

## 2024-07-22 NOTE — PROGRESS NOTES
"Subjective   Patient ID: Elizabeth Coto is a 49 y.o. female who presents for Annual Exam (EP.  Annual exam with no pap or breast exam.  Labs done.  No concerns.).  HPI  Patient is here for annual check-up    Last well check 1 yr    Reported health good    Dental  check  reg     Vision check reg   Vision issues n  Hearing issues n  Vaccines UTD  y  Sees Gyn gets mammo order had ablation   Diet healthy  Exercise reg  Caffeine 1 d  Alcohol social  Tobacco n    Colon cancer screening  2023    Current issues: has slacked off on daily routine and sugar is up  has strong fmh cad and dm       Review of Systems  GENERAL - Denies fever, fatigue or chills  SKIN - Denies rash, new skin lesions, or change in moles  EYES - Denies blurred vision, or change in visual acuity  EARS - Denies ear pain, discharge, ringing, or difficulty hearing  NOSE - Denies nasal congestion, discharge, or bleeding  MOUTH - Denies sore throat, postnasal drip or painful/difficulty swallowing  NECK - Denies pain or swelling  RESPIRATORY - Denies shortness of breath, cough, wheezing  CARDIOVASCULAR - Denies palpitations, chest pain, orthopnea, peripheral edema, syncope or claudication  GASTROINTESTINAL - Denies nausea, vomiting, diarrhea, constipation, abdominal pain, melena and or bright red blood  GENITOURINARY - Denies dysuria, frequency of urination, urgency, or hesitancy  MUSCULOSKELETAL - Denies joint or muscle pain, or back pain  NEUROLOGICAL - Denies localized numbness, weakness, or tingling  PSYCHIATRIC - Denies depression, anxiety, substance abuse, suicidal or homicidal ideation  ENDOCRINE - Denies heat or cold intolerance, weight loss or gain, increasing thirst  HEMATO-IMMUNOLOGIC - Denies easy bruising, bleeding, oral ulcerations or recurrent infections     Objective   /72   Pulse 84   Temp 36.8 °C (98.3 °F) (Oral)   Ht 1.6 m (5' 3\")   Wt 58.1 kg (128 lb)   SpO2 98%   BMI 22.67 kg/m²    Physical Exam  CONSTITUTIONAL - well " nourished, well developed, looks like stated age, in no acute distress, not ill-appearing, and not tired appearing  SKIN - normal skin color and pigmentation, normal skin turgor without rash, lesions, or nodules visualized  HEAD - no trauma, normocephalic  EYES  - nl b eomi and fundi nl  ENT - TM's intact, no injection, no exudate,  nasal passage without discharge and patent  NECK - supple without rigidity, no neck mass was observed, no thyromegaly or thyroid nodules  CHEST - clear to auscultation, no wheezing, no crackles and no rales, good effort  CARDIAC - regular rate and regular rhythm, no skipped beats, no murmur  ABDOMEN - no organomegaly, soft, nontender, nondistended, normal bowel sounds, no guarding/rebound/rigidity  EXTREMITIES - no edema, no deformities  NEUROLOGICAL -   cn 2 to 12 intact non fical no deficits   PSYCHIATRIC - alert, pleasant and cordial, age-appropriate  LYMPHATIC- no cervical lymphadenopathy    Assessment/Plan   Diagnoses and all orders for this visit:  Dyslipidemia  -     metFORMIN XR (Glucophage-XR) 500 mg 24 hr tablet; Take 1 tablet (500 mg) by mouth once daily with breakfast. Do not crush, chew, or split.  -     Hemoglobin A1C; Future  -     Lipid Panel; Future  -     Comprehensive Metabolic Panel; Future  -     Referral to Nutrition Services; Future  Fasting hyperglycemia  -     metFORMIN XR (Glucophage-XR) 500 mg 24 hr tablet; Take 1 tablet (500 mg) by mouth once daily with breakfast. Do not crush, chew, or split.  -     Hemoglobin A1C; Future  -     Lipid Panel; Future  -     Comprehensive Metabolic Panel; Future  -     Referral to Nutrition Services; Future    Call with issues  Blood work ordered  Refills are sent  Follow-up with me in 3 months  She will start metformin  She will do blood work before next appointment  We have referred to nutrition    Lorena Tate MD

## 2024-09-18 ENCOUNTER — PATIENT MESSAGE (OUTPATIENT)
Dept: PRIMARY CARE | Facility: CLINIC | Age: 49
End: 2024-09-18
Payer: COMMERCIAL

## 2024-09-18 DIAGNOSIS — R73.03 BORDERLINE DIABETES MELLITUS: ICD-10-CM

## 2024-09-18 RX ORDER — LANCETS
EACH MISCELLANEOUS
Qty: 100 EACH | Refills: 1 | Status: SHIPPED | OUTPATIENT
Start: 2024-09-18

## 2024-09-18 RX ORDER — BLOOD SUGAR DIAGNOSTIC
1 STRIP MISCELLANEOUS DAILY
Qty: 100 STRIP | Refills: 1 | Status: SHIPPED | OUTPATIENT
Start: 2024-09-18

## 2024-10-08 ENCOUNTER — NUTRITION (OUTPATIENT)
Dept: NUTRITION | Facility: CLINIC | Age: 49
End: 2024-10-08
Payer: COMMERCIAL

## 2024-10-08 VITALS — WEIGHT: 128.31 LBS | HEIGHT: 63 IN | BODY MASS INDEX: 22.73 KG/M2

## 2024-10-08 DIAGNOSIS — R73.01 FASTING HYPERGLYCEMIA: ICD-10-CM

## 2024-10-08 DIAGNOSIS — E78.5 DYSLIPIDEMIA: ICD-10-CM

## 2024-10-08 PROCEDURE — 97803 MED NUTRITION INDIV SUBSEQ: CPT | Performed by: DIETITIAN, REGISTERED

## 2024-10-08 NOTE — PATIENT INSTRUCTIONS
"1) Aim for three meals and 2 snacks per day. Strive to consume breakfast within 1 -2 hours of waking to jump start the metabolism. Aim for breakfast at 8:00, snack if needed at 10:30, lunch at 1:00, snack at 4:00,  dinner at 6:00 pm and a bedtime snack at 9:00 pm.  2)  Strive to include protein at the meals and even snacks. Protein at meals can increase the metabolism too.  Protein at snacks can sustain energy, stabilize blood glucose, and provide more contentment. Protein foods include eggs, egg whites, cheese, nuts, nut butters, Greek yogurt, fish, tofu, plant-based protein powder, and/or plant-based protein drinks.   3) The plate method was recommended to help portion foods at meals and to structure. Using a 9\" plate, recommended for 1/2 of the plate to include non-starchy vegetable and suggested to consume this first.  Recommended protein to be include of about 2-4  ounces at meals.   4) Even at snacks strive to consume a carbohydrate and protein. Carbohydrates provide energy and protein helps to sustain the energy. Having carbohydrates by itself can increase blood glucose; thus, strive to pair.     Educational Handouts/Practices: Plate Method for Diabetes   Next Session: CHO counting     Liliya Ceballos, MS, RDN, LD, KARON   Advanced Practice Clinical Dietitian  [email or web address]  Schedule line [phone]  Direct line [phone]       "

## 2024-10-08 NOTE — PROGRESS NOTES
"Reason for Nutrition Visit:  Pt is a 49 y.o. female being seen at [place]. Pt was referred by Dr. Lorena Tate effective 7/22/24.   1. Dyslipidemia  Referral to Nutrition Services      2. Fasting hyperglycemia  Referral to Nutrition Services           Past Medical Hx:  Patient Active Problem List   Diagnosis    Menopausal symptoms          Current Outpatient Medications:     ALPRAZolam (Xanax) 0.25 mg tablet, 1 to 2 tab po  1 hour before flight, Disp: 6 tablet, Rfl: 0    FreeStyle glucose monitoring kit, Borderline dm noninsulin dependent hba1c 6.4, Disp: 1 each, Rfl: 0    FreeStyle Test strip, 1 strip once daily., Disp: 100 strip, Rfl: 1    lancets misc, Borderline dm hba1c 6.4 to test once daily, Disp: 100 each, Rfl: 1    metFORMIN XR (Glucophage-XR) 500 mg 24 hr tablet, Take 1 tablet (500 mg) by mouth once daily with breakfast. Do not crush, chew, or split., Disp: 90 tablet, Rfl: 0    pantoprazole (ProtoNix) 40 mg EC tablet, Take 1 tablet (40 mg) by mouth once daily., Disp: , Rfl:     PARoxetine mesylate,menop.sym, 7.5 mg capsule, Take 7.5 mg by mouth once daily., Disp: , Rfl:      Anthropometrics:  Anthropometrics  Height: 160 cm (5' 2.99\")  Weight: 58.2 kg (128 lb 4.9 oz)  BMI (Calculated): 22.73   Weight change:    Significant Weight Change: No  10/8/24 Weight: 128 lbs     Lab Results   Component Value Date    HGBA1C 6.3 (H) 07/19/2024    CHOL 172 07/19/2024    LDLF 107 (H) 07/13/2023    TRIG 70 07/19/2024    HDL 62.6 07/19/2024          Chemistry    Lab Results   Component Value Date/Time     07/19/2024 1012    K 4.1 07/19/2024 1012     07/19/2024 1012    CO2 28 07/19/2024 1012    BUN 12 07/19/2024 1012    CREATININE 0.76 07/19/2024 1012    Lab Results   Component Value Date/Time    CALCIUM 9.8 07/19/2024 1012    ALKPHOS 80 07/19/2024 1012    AST 16 07/19/2024 1012    ALT 14 07/19/2024 1012    BILITOT 0.6 07/19/2024 1012        Food and Nutrition Hx:  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 "   She has started Metformin. She plan on going back to get another A1c. She is testing blood glucose. FBG has been 103- 108 mg/dl. She does notice a difference when she walks at night.   Pt wakes up at 7:00 am. She does not have a lot of energy upon waking   She works between 8:30- 12:30 pm.     24 Diet Recall:  Meal 1: Breakfast is at 8:00 to include 3/4 cup of Kashi Go Lean and a cup of almond milk (CHO 20)  Snack is at 10:30 to include nuts, cheese or a red peppers.   Meal 2: Lunch is at 1:00 to include 0.5 cup of  cous cous, 0.5 cup of vegetables such as cucumbers with a fruit.   Energy drops at 4:00 pm   Meal 3: Dinner is at 5:00 to include 1 cup of tofu, 0.5 cup of noodles with 0.5 cup of cucumbers (CHO 20).   Snack is at 6:00 to include chocolate.   Snacks:  Beverages: 50 ounces of water per day, 1 diet drHortencia Costa is consumed per day.     Allergies: None  Intolerance: Lactose  Appetite: Good  Intake: >75%  GI Symptoms : None   Swallowing Difficulty: No problems with swallowing  Dentition : own    Types of Activities: Walking  Walking 5-6 days a week for 30 minutes at a moderate intensity level.     Sleep duration/quality : 5-6 hours and disrupted sleep. She has an inability to go back to bed.   Sleep disorders: none    Supplements: Multivitamin, Vitamin D, and Calcium daily    Feelings of Hunger?: Yes and will eat  Cravings: Sweet  Energy Levels: Stable    Food Preparation: Patient  Cooking Skills/Barriers: Low preference for cooking  Grocery Shopping: Patient    Nutrition Focused Physical Exam:    Performed/Deferred: Deferred as pt visually appears well-nourished with no signs of malnutrition    Nutrition Focused Physical Exam:  Physical Findings (Nutrition Deficiency/Toxicity)  Hair: Negative  Eyes: Negative  Mouth: Negative  Nails: Negative  Skin: Negative     Malnutrition Present: No    Estimated Energy Needs:    Weight Maintanence: 1,450- 1,566 kcal/day based off 25-27 kg per kg of bodyweight.     58  grams of protein per day based of 1 g per kilogram of bodyweight per day.     Nutrition Diagnosis:  Nutrition Diagnosis     Patient has Nutrition Diagnosis Yes   Diagnosis Status (1) New   Nutrition Diagnosis 1 Food and nutrition related knowledge deficit   Related to (1) how to eat for diabetes with a1c at 6.3%   As Evidenced by (1) reports by pt of the need to learn.   Additional Nutrition Diagnosis Diagnosis 2   Diagnosis Status (2) New   Nutrition Diagnosis 2 Inadequate protein intake   Related to (2) vegetarian diet with the presence of diabetes where protein is omitted from meals and snacks   As Evidenced by (2) protein intake does not meet daily needs.   Diagnosis Status (3) New   Nutrition Diagnosis 3 Inadequate carbohydrate intake   Related to (3) limited time to eat coupled by drops in energy during the day   As Evidenced by (3) CHO at meals does not meet meal recommendations for those with diabetes.       Nutrition Interventions:  Medical nutrition therapy was given for impaired fasting blood glucose and dyslipidemia.   Nutrition Counseling: Motivational Interviewing and CBT  Coordination of Care: None    Nutrition Recommendations:  1,450-1,566 calories per day to maintain weight. 30- 45 grams of CHO at meals and 15 grams at snacks per day to help reduce A1c. Adequate protein intake of 58 grams per day. Heart healthy meal plan with a low saturated fat intake to <5 -6 % of energy (less than 13 grams of saturated fat per day), reduced intake of added sugars (<10% of total energy), 25 grams of fiber with intake of viscous fiber to 5 g/day to 10 g/day, plant sterols/stanols to 2 g/day, 1.1 gram of omega three fatty acids, and a 1,500 mg sodium restriction.     Nutrition Goals:  Via teach back method patient verbalized understanding of the following topics:  1) Aim for three meals and 2 snacks per day. Strive to consume breakfast within 1 -2 hours of waking to jump start the metabolism. Aim for breakfast at  "8:00, snack if needed at 10:30, lunch at 1:00, snack at 4:00,  dinner at 6:00 pm and a bedtime snack at 9:00 pm.  2)  Strive to include protein at the meals and even snacks. Protein at meals can increase the metabolism too.  Protein at snacks can sustain energy, stabilize blood glucose, and provide more contentment. Protein foods include eggs, egg whites, cheese, nuts, nut butters, Greek yogurt, fish, tofu, plant-based protein powder, and/or plant-based protein drinks.   3) The plate method was recommended to help portion foods at meals and to structure. Using a 9\" plate, recommended for 1/2 of the plate to include non-starchy vegetable and suggested to consume this first.  Recommended protein to be include of about 2-4  ounces at meals.   4) Even at snacks strive to consume a carbohydrate and protein. Carbohydrates provide energy and protein helps to sustain the energy. Having carbohydrates by itself can increase blood glucose; thus, strive to pair.     Educational Handouts/Practices: Plate Method for Diabetes   Next Session: CHO counting     Liliya Ceballos, MS, RDN, LD, KARON   Advanced Practice Clinical Dietitian  [email or web address]  Schedule line [phone]  Direct line [phone]     Readiness to Change : Good  Level of Understanding : Good  Anticipated Compliant : Good  "

## 2024-10-18 ENCOUNTER — LAB (OUTPATIENT)
Dept: LAB | Facility: LAB | Age: 49
End: 2024-10-18
Payer: COMMERCIAL

## 2024-10-18 DIAGNOSIS — R73.01 FASTING HYPERGLYCEMIA: ICD-10-CM

## 2024-10-18 DIAGNOSIS — E78.5 DYSLIPIDEMIA: ICD-10-CM

## 2024-10-18 LAB
ALBUMIN SERPL BCP-MCNC: 4.3 G/DL (ref 3.4–5)
ALP SERPL-CCNC: 68 U/L (ref 33–110)
ALT SERPL W P-5'-P-CCNC: 15 U/L (ref 7–45)
ANION GAP SERPL CALC-SCNC: 12 MMOL/L (ref 10–20)
AST SERPL W P-5'-P-CCNC: 16 U/L (ref 9–39)
BILIRUB SERPL-MCNC: 0.5 MG/DL (ref 0–1.2)
BUN SERPL-MCNC: 11 MG/DL (ref 6–23)
CALCIUM SERPL-MCNC: 9.7 MG/DL (ref 8.6–10.6)
CHLORIDE SERPL-SCNC: 105 MMOL/L (ref 98–107)
CHOLEST SERPL-MCNC: 173 MG/DL (ref 0–199)
CHOLESTEROL/HDL RATIO: 3.2
CO2 SERPL-SCNC: 31 MMOL/L (ref 21–32)
CREAT SERPL-MCNC: 0.75 MG/DL (ref 0.5–1.05)
EGFRCR SERPLBLD CKD-EPI 2021: >90 ML/MIN/1.73M*2
EST. AVERAGE GLUCOSE BLD GHB EST-MCNC: 131 MG/DL
GLUCOSE SERPL-MCNC: 105 MG/DL (ref 74–99)
HBA1C MFR BLD: 6.2 %
HDLC SERPL-MCNC: 54.1 MG/DL
LDLC SERPL CALC-MCNC: 101 MG/DL
NON HDL CHOLESTEROL: 119 MG/DL (ref 0–149)
POTASSIUM SERPL-SCNC: 4.5 MMOL/L (ref 3.5–5.3)
PROT SERPL-MCNC: 6.6 G/DL (ref 6.4–8.2)
SODIUM SERPL-SCNC: 143 MMOL/L (ref 136–145)
TRIGL SERPL-MCNC: 88 MG/DL (ref 0–149)
VLDL: 18 MG/DL (ref 0–40)

## 2024-10-18 PROCEDURE — 80061 LIPID PANEL: CPT

## 2024-10-18 PROCEDURE — 80053 COMPREHEN METABOLIC PANEL: CPT

## 2024-10-18 PROCEDURE — 83036 HEMOGLOBIN GLYCOSYLATED A1C: CPT

## 2024-10-18 PROCEDURE — 36415 COLL VENOUS BLD VENIPUNCTURE: CPT

## 2024-10-21 DIAGNOSIS — R73.01 FASTING HYPERGLYCEMIA: ICD-10-CM

## 2024-10-21 DIAGNOSIS — E78.5 DYSLIPIDEMIA: ICD-10-CM

## 2024-10-21 RX ORDER — METFORMIN HYDROCHLORIDE 500 MG/1
500 TABLET, EXTENDED RELEASE ORAL
Qty: 90 TABLET | Refills: 0 | Status: SHIPPED | OUTPATIENT
Start: 2024-10-21 | End: 2024-10-25 | Stop reason: SDUPTHER

## 2024-10-25 ENCOUNTER — APPOINTMENT (OUTPATIENT)
Dept: PRIMARY CARE | Facility: CLINIC | Age: 49
End: 2024-10-25
Payer: COMMERCIAL

## 2024-10-25 VITALS
BODY MASS INDEX: 23.37 KG/M2 | TEMPERATURE: 98.1 F | SYSTOLIC BLOOD PRESSURE: 114 MMHG | OXYGEN SATURATION: 96 % | HEIGHT: 62 IN | WEIGHT: 127 LBS | DIASTOLIC BLOOD PRESSURE: 76 MMHG | HEART RATE: 74 BPM

## 2024-10-25 DIAGNOSIS — E78.5 DYSLIPIDEMIA: ICD-10-CM

## 2024-10-25 DIAGNOSIS — R73.03 BORDERLINE DIABETES MELLITUS: ICD-10-CM

## 2024-10-25 DIAGNOSIS — Z23 FLU VACCINE NEED: ICD-10-CM

## 2024-10-25 DIAGNOSIS — R73.01 FASTING HYPERGLYCEMIA: ICD-10-CM

## 2024-10-25 PROCEDURE — 3008F BODY MASS INDEX DOCD: CPT | Performed by: INTERNAL MEDICINE

## 2024-10-25 PROCEDURE — 1036F TOBACCO NON-USER: CPT | Performed by: INTERNAL MEDICINE

## 2024-10-25 PROCEDURE — 90656 IIV3 VACC NO PRSV 0.5 ML IM: CPT | Performed by: INTERNAL MEDICINE

## 2024-10-25 PROCEDURE — 99214 OFFICE O/P EST MOD 30 MIN: CPT | Performed by: INTERNAL MEDICINE

## 2024-10-25 PROCEDURE — 90471 IMMUNIZATION ADMIN: CPT | Performed by: INTERNAL MEDICINE

## 2024-10-25 RX ORDER — BLOOD SUGAR DIAGNOSTIC
1 STRIP MISCELLANEOUS DAILY
Qty: 100 STRIP | Refills: 1 | Status: SHIPPED | OUTPATIENT
Start: 2024-10-25

## 2024-10-25 RX ORDER — METFORMIN HYDROCHLORIDE 500 MG/1
1000 TABLET, EXTENDED RELEASE ORAL
Qty: 180 TABLET | Refills: 1 | Status: SHIPPED | OUTPATIENT
Start: 2024-10-25 | End: 2025-04-23

## 2024-10-25 ASSESSMENT — ENCOUNTER SYMPTOMS
OCCASIONAL FEELINGS OF UNSTEADINESS: 0
LOSS OF SENSATION IN FEET: 0
DEPRESSION: 0

## 2024-10-25 NOTE — PROGRESS NOTES
"Subjective   Patient ID: Elizabeth Coto is a 49 y.o. female who presents for Follow-up (EP.  Follow up hyperglycemia.  Labs done.  No concerns.).  HPI  Patient presents today for follow-up of hyperglycemia.  She has been on metformin for 3 months.  She is disappointed because her numbers are not much better.  She is working on diet and exercise and she has been very diligent.  She has no symptoms of high or low blood sugar no excessive urination and her energy level is good.  She does have a family history of diabetes in her father.  She has no vision issues no numbness or tingling no neuropathy symptoms.  She has been consistent with her metformin daily and takes it at dinnertime    Review of Systems  Review of systems was performed and is otherwise negative except as noted in HPI.      Objective   /76   Pulse 74   Temp 36.7 °C (98.1 °F) (Oral)   Ht 1.575 m (5' 2\")   Wt 57.6 kg (127 lb)   SpO2 96%   BMI 23.23 kg/m²      Physical Exam  HEENT is normal  Lungs clear bilaterally  Heart is regular rate rhythm no murmurs  Abdomen benign  Lower extremities no edema     Assessment/Plan   Diagnoses and all orders for this visit:  Borderline diabetes mellitus  -     FreeStyle Test strip; 1 strip once daily.  -     Hemoglobin A1C; Future  -     Lipid Panel; Future  -     Comprehensive Metabolic Panel; Future  -     Hemoglobin A1C; Future  -     Lipid Panel; Future  -     Comprehensive Metabolic Panel; Future  Dyslipidemia  -     metFORMIN XR (Glucophage-XR) 500 mg 24 hr tablet; Take 2 tablets (1,000 mg) by mouth once daily with breakfast. Do not crush, chew, or split.  -     Hemoglobin A1C; Future  -     Lipid Panel; Future  -     Comprehensive Metabolic Panel; Future  -     Hemoglobin A1C; Future  -     Lipid Panel; Future  -     Comprehensive Metabolic Panel; Future  Fasting hyperglycemia  -     metFORMIN XR (Glucophage-XR) 500 mg 24 hr tablet; Take 2 tablets (1,000 mg) by mouth once daily with breakfast. Do not " crush, chew, or split.  -     Hemoglobin A1C; Future  -     Lipid Panel; Future  -     Comprehensive Metabolic Panel; Future  Flu vaccine need  -     Flu vaccine, trivalent, preservative free, age 6 months and greater (Fluarix/Fluzone/Flulaval)  Suboptimally controlled blood sugar  Call with issues  Increase metformin to 2 pills daily  Blood work in 3 months  Follow-up with him at that time  Continue to work on diet and exercise    Lorena Tate MD

## 2024-12-12 DIAGNOSIS — R73.01 FASTING HYPERGLYCEMIA: ICD-10-CM

## 2024-12-12 DIAGNOSIS — E78.5 DYSLIPIDEMIA: ICD-10-CM

## 2024-12-12 RX ORDER — METFORMIN HYDROCHLORIDE 500 MG/1
1000 TABLET, EXTENDED RELEASE ORAL
Qty: 180 TABLET | Refills: 1 | Status: SHIPPED | OUTPATIENT
Start: 2024-12-12 | End: 2025-06-10

## 2024-12-31 ENCOUNTER — APPOINTMENT (OUTPATIENT)
Dept: NUTRITION | Facility: CLINIC | Age: 49
End: 2024-12-31
Payer: COMMERCIAL

## 2024-12-31 ENCOUNTER — NUTRITION (OUTPATIENT)
Dept: NUTRITION | Facility: CLINIC | Age: 49
End: 2024-12-31
Payer: COMMERCIAL

## 2024-12-31 VITALS — BODY MASS INDEX: 23.53 KG/M2 | HEIGHT: 62 IN | WEIGHT: 127.87 LBS

## 2024-12-31 DIAGNOSIS — R73.01 FASTING HYPERGLYCEMIA: ICD-10-CM

## 2024-12-31 DIAGNOSIS — E78.5 DYSLIPIDEMIA: Primary | ICD-10-CM

## 2024-12-31 PROCEDURE — 97803 MED NUTRITION INDIV SUBSEQ: CPT | Performed by: DIETITIAN, REGISTERED

## 2024-12-31 NOTE — PATIENT INSTRUCTIONS
1) Aim for three meals and 2 snacks per day. Strive to consume breakfast within 1 -2 hours of waking to jump start the metabolism. Aim for breakfast at 8:00, snack if needed at 10:30, lunch at 1:00, snack at 4:00 is hunger is felt (drop in energy),  dinner at 6:00 pm and always bedtime snack at 9:00 pm.  2)  Strive to include protein at the meals and even snacks. Protein at meals can increase the metabolism too.  Protein at snacks can sustain energy, stabilize blood glucose, and provide more contentment. Protein foods include eggs, egg whites, cheese, nuts, nut butters, Greek yogurt, fish, tofu, plant-based protein powder, and/or plant-based protein drinks.   3) Eat a consistent carbohydrate intake with aim for 45 grams of CHO at meals and 15 grams at snacks per day.  4) Strive to consume 2 -3 ounces of protein at lunch and dinner.     Educational Handouts/Practices: [facility]'s CHO counting   Plate Method for Diabetes   Next Session: meal planning     Liliya Ceballos, MS, RDN, LD, KARON, MB-EAT-P Advanced Practice Clinical Dietitian Mindfulness-Based Eating Awareness Training Practitioner [facility] [place] [email or web address] Scheduling Line [phone] Direct Line [phone]

## 2024-12-31 NOTE — PROGRESS NOTES
"Reason for Nutrition Visit:  Pt is a 49 y.o. female being seen at [place]. Pt was referred by Dr. Lorena Tate effective 7/22/24.     1. Dyslipidemia [E78.5]        2. Fasting hyperglycemia [R73.01]             Past Medical Hx:  Patient Active Problem List   Diagnosis    Menopausal symptoms        Current Outpatient Medications:     ALPRAZolam (Xanax) 0.25 mg tablet, 1 to 2 tab po  1 hour before flight, Disp: 6 tablet, Rfl: 0    FreeStyle glucose monitoring kit, Borderline dm noninsulin dependent hba1c 6.4, Disp: 1 each, Rfl: 0    FreeStyle Test strip, 1 strip once daily., Disp: 100 strip, Rfl: 1    lancets misc, Borderline dm hba1c 6.4 to test once daily, Disp: 100 each, Rfl: 1    metFORMIN XR (Glucophage-XR) 500 mg 24 hr tablet, Take 2 tablets (1,000 mg) by mouth once daily with breakfast. Do not crush, chew, or split., Disp: 180 tablet, Rfl: 1    pantoprazole (ProtoNix) 40 mg EC tablet, Take 1 tablet (40 mg) by mouth once daily., Disp: , Rfl:     PARoxetine mesylate,menop.sym, 7.5 mg capsule, Take 7.5 mg by mouth once daily., Disp: , Rfl:      Anthropometrics:  Anthropometrics  Height: 157.5 cm (5' 2.01\")  Weight: 58 kg (127 lb 13.9 oz)  BMI (Calculated): 23.38   Weight change:    Significant Weight Change: No  10/8/24 Weight: 128 lbs   12/31/24 Weight: 127 lbs    Lab Results   Component Value Date    HGBA1C 6.2 (H) 10/18/2024    CHOL 173 10/18/2024    LDLF 107 (H) 07/13/2023    TRIG 88 10/18/2024    HDL 54.1 10/18/2024          Chemistry    Lab Results   Component Value Date/Time     10/18/2024 0803    K 4.5 10/18/2024 0803     10/18/2024 0803    CO2 31 10/18/2024 0803    BUN 11 10/18/2024 0803    CREATININE 0.75 10/18/2024 0803    Lab Results   Component Value Date/Time    CALCIUM 9.7 10/18/2024 0803    ALKPHOS 68 10/18/2024 0803    AST 16 10/18/2024 0803    ALT 15 10/18/2024 0803    BILITOT 0.5 10/18/2024 0803        Food and Nutrition Hx:  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   She has " started Metformin. She plan on going back to get another A1c. She is testing blood glucose. FBG has been 103- 108 mg/dl. She does notice a difference when she walks at night.     Pt had another follow-up apt. Ha=gb A1c was reduced to just 6.2%. This was only 4 weeks after the first nutrition consult but she started metformin 7/24. Her provider increased Metformin and she states she does not feel well. She is waking very hungry at 5:00 am. She has been waking up in the middle feeling hungry. When she consumes the bedtime snack she can wake up with lower blood glucose at 97 mg/dl. She is not hungry at diner. She notes her blood glucose has been more by 70 mg/dl. She has been using the plate method to help balance meals. She has been focusing on protein.    Pt wakes up at 7:00 am. She does not have a lot of energy upon waking   She works between 8:30- 12:30 pm.     24 Diet Recall:  Meal 1: Breakfast is at 8:00 to include 3/4 cup of Kashi Go Lean protein peanut butter and a cup of almond milk (CHO 31, protein 10)  Meal 2: Lunch is at 1:00 to include un-crustable (CHO 31, protein 9) with carrots.   Energy drops at 4:00 pm.She is not eating a snack to include cheese and 7 crackers (CHO 20, protein 7).   Meal 3: Dinner is at 5:00 to include 0.5 terra wrap with vegetables and 1 ounce of cheese (CHO 25-30, protein 10)  Snack is at 9:00 to include apple with 1 T peanut butter (Cho 15, protein 5)   Beverages: 50 ounces of water per day, 1 diet dr. Costa is consumed per day.   Total CHO 40. Protein intake is 41 grams.     Allergies: None  Intolerance: Lactose  Appetite: Good  Intake: >75%  GI Symptoms : None   Swallowing Difficulty: No problems with swallowing  Dentition : own    Types of Activities: Walking  Walking 5-6 days a week for 30 minutes at a moderate intensity level.     Sleep duration/quality : 5-6 hours and disrupted sleep. She has an inability to go back to bed.   Sleep disorders: none    Supplements:  Multivitamin, Vitamin D, and Calcium daily    Feelings of Hunger?: Yes and will eat  Cravings: Sweet  Energy Levels: Stable    Food Preparation: Patient  Cooking Skills/Barriers: Low preference for cooking  Grocery Shopping: Patient    Nutrition Focused Physical Exam:    Performed/Deferred: Deferred as pt visually appears well-nourished with no signs of malnutrition    Nutrition Focused Physical Exam:      Malnutrition Present: No    Estimated Energy Needs:    Weight Maintanence: 1,450- 1,566 kcal/day based off 25-27 kg per kg of bodyweight.     58 grams of protein per day based of 1 g per kilogram of bodyweight per day.     Nutrition Diagnosis:  Nutrition Diagnosis     Patient has Nutrition Diagnosis Yes   Diagnosis Status (1) Improving    Nutrition Diagnosis 1 Food and nutrition related knowledge deficit   Related to (1) how to eat for diabetes with a1c at 6.3%. (NOTE: A1c now 6.2%)   As Evidenced by (1) reports by pt of the need to learn.   Additional Nutrition Diagnosis Diagnosis 2   Diagnosis Status (2) Improving    Nutrition Diagnosis 2 Inadequate protein intake   Related to (2) vegetarian diet with the presence of diabetes where protein is omitted from meals and snacks   As Evidenced by (2) protein intake does not meet daily needs.   Diagnosis Status (3) Ongoing    Nutrition Diagnosis 3 Inadequate carbohydrate intake   Related to (3) limited time to eat coupled by drops in energy during the day   As Evidenced by (3) CHO at meals does not meet meal recommendations for those with diabetes.     Nutrition Interventions:  Medical nutrition therapy was given for impaired fasting blood glucose and dyslipidemia.   Nutrition Counseling: Motivational Interviewing and CBT  Coordination of Care: None    Nutrition Recommendations:  1,450-1,566 calories per day to maintain weight. 45 grams of CHO at meals and 15 grams at snacks per day to help reduce A1c. Adequate protein intake of 58 grams per day. Heart healthy meal plan  with a low saturated fat intake to <5 -6 % of energy (less than 13 grams of saturated fat per day), reduced intake of added sugars (<10% of total energy), 25 grams of fiber with intake of viscous fiber to 5 g/day to 10 g/day, plant sterols/stanols to 2 g/day, 1.1 gram of omega three fatty acids, and a 1,500 mg sodium restriction.     Nutrition Goals:  Via teach back method patient verbalized understanding of the following topics:  1) Aim for three meals and 2 snacks per day. Strive to consume breakfast within 1 -2 hours of waking to jump start the metabolism. Aim for breakfast at 8:00, snack if needed at 10:30, lunch at 1:00, snack at 4:00 is hunger is felt (drop in energy),  dinner at 6:00 pm and always bedtime snack at 9:00 pm.  2)  Strive to include protein at the meals and even snacks. Protein at meals can increase the metabolism too.  Protein at snacks can sustain energy, stabilize blood glucose, and provide more contentment. Protein foods include eggs, egg whites, cheese, nuts, nut butters, Greek yogurt, fish, tofu, plant-based protein powder, and/or plant-based protein drinks.   3) Eat a consistent carbohydrate intake with aim for 45 grams of CHO at meals and 15 grams at snacks per day.  4) Strive to consume 2 -3 ounces of protein at lunch and dinner.     Educational Handouts/Practices: [facility]'s CHO counting   Plate Method for Diabetes   Next Session: meal planning     Liliya Ceballos, MS, RDN, LD, KARON, MB-EAT-P Advanced Practice Clinical Dietitian Mindfulness-Based Eating Awareness Training Practitioner [facility] [email or web address] Scheduling Line [phone] Direct Line [phone]     Readiness to Change : Good  Level of Understanding : Good  Anticipated Compliant : Good

## 2025-01-13 DIAGNOSIS — F41.9 ANXIETY: ICD-10-CM

## 2025-01-13 RX ORDER — ALPRAZOLAM 0.25 MG/1
TABLET ORAL
Qty: 6 TABLET | Refills: 0 | Status: SHIPPED | OUTPATIENT
Start: 2025-01-13

## 2025-02-01 LAB
ALBUMIN SERPL-MCNC: 4.3 G/DL (ref 3.6–5.1)
ALP SERPL-CCNC: 70 U/L (ref 37–153)
ALT SERPL-CCNC: 16 U/L (ref 6–29)
ANION GAP SERPL CALCULATED.4IONS-SCNC: 7 MMOL/L (CALC) (ref 7–17)
AST SERPL-CCNC: 14 U/L (ref 10–35)
BILIRUB SERPL-MCNC: 0.5 MG/DL (ref 0.2–1.2)
BUN SERPL-MCNC: 11 MG/DL (ref 7–25)
CALCIUM SERPL-MCNC: 9.7 MG/DL (ref 8.6–10.4)
CHLORIDE SERPL-SCNC: 103 MMOL/L (ref 98–110)
CHOLEST SERPL-MCNC: 176 MG/DL
CHOLEST/HDLC SERPL: 3.2 (CALC)
CO2 SERPL-SCNC: 29 MMOL/L (ref 20–32)
CREAT SERPL-MCNC: 0.6 MG/DL (ref 0.5–1.03)
EGFRCR SERPLBLD CKD-EPI 2021: 109 ML/MIN/1.73M2
EST. AVERAGE GLUCOSE BLD GHB EST-MCNC: 131 MG/DL
EST. AVERAGE GLUCOSE BLD GHB EST-SCNC: 7.3 MMOL/L
GLUCOSE SERPL-MCNC: 102 MG/DL (ref 65–99)
HBA1C MFR BLD: 6.2 % OF TOTAL HGB
HDLC SERPL-MCNC: 55 MG/DL
LDLC SERPL CALC-MCNC: 100 MG/DL (CALC)
NONHDLC SERPL-MCNC: 121 MG/DL (CALC)
POTASSIUM SERPL-SCNC: 4.5 MMOL/L (ref 3.5–5.3)
PROT SERPL-MCNC: 6.8 G/DL (ref 6.1–8.1)
SODIUM SERPL-SCNC: 139 MMOL/L (ref 135–146)
TRIGL SERPL-MCNC: 110 MG/DL

## 2025-02-05 ENCOUNTER — APPOINTMENT (OUTPATIENT)
Dept: PRIMARY CARE | Facility: CLINIC | Age: 50
End: 2025-02-05
Payer: COMMERCIAL

## 2025-02-05 VITALS
OXYGEN SATURATION: 95 % | DIASTOLIC BLOOD PRESSURE: 72 MMHG | HEIGHT: 62 IN | TEMPERATURE: 97.7 F | HEART RATE: 76 BPM | BODY MASS INDEX: 23.74 KG/M2 | WEIGHT: 129 LBS | SYSTOLIC BLOOD PRESSURE: 144 MMHG

## 2025-02-05 DIAGNOSIS — R73.03 BORDERLINE DIABETES MELLITUS: Primary | ICD-10-CM

## 2025-02-05 DIAGNOSIS — E78.5 DYSLIPIDEMIA: ICD-10-CM

## 2025-02-05 DIAGNOSIS — R73.01 FASTING HYPERGLYCEMIA: ICD-10-CM

## 2025-02-05 DIAGNOSIS — F41.9 ANXIETY: ICD-10-CM

## 2025-02-05 PROCEDURE — 1036F TOBACCO NON-USER: CPT | Performed by: INTERNAL MEDICINE

## 2025-02-05 PROCEDURE — 99214 OFFICE O/P EST MOD 30 MIN: CPT | Performed by: INTERNAL MEDICINE

## 2025-02-05 PROCEDURE — 3008F BODY MASS INDEX DOCD: CPT | Performed by: INTERNAL MEDICINE

## 2025-02-05 RX ORDER — ALPRAZOLAM 0.25 MG/1
TABLET ORAL
Qty: 6 TABLET | Refills: 0 | Status: SHIPPED | OUTPATIENT
Start: 2025-02-05

## 2025-02-05 RX ORDER — METFORMIN HYDROCHLORIDE 500 MG/1
1000 TABLET, EXTENDED RELEASE ORAL
Qty: 180 TABLET | Refills: 1 | Status: SHIPPED | OUTPATIENT
Start: 2025-02-05 | End: 2025-08-04

## 2025-02-05 ASSESSMENT — ENCOUNTER SYMPTOMS
DEPRESSION: 0
OCCASIONAL FEELINGS OF UNSTEADINESS: 0
LOSS OF SENSATION IN FEET: 0

## 2025-02-05 ASSESSMENT — PATIENT HEALTH QUESTIONNAIRE - PHQ9
SUM OF ALL RESPONSES TO PHQ9 QUESTIONS 1 AND 2: 0
1. LITTLE INTEREST OR PLEASURE IN DOING THINGS: NOT AT ALL
2. FEELING DOWN, DEPRESSED OR HOPELESS: NOT AT ALL

## 2025-02-05 NOTE — PROGRESS NOTES
"Subjective   Patient ID: Elizabeth Coto is a 49 y.o. female who presents for Follow-up (EP.  Follow up DMII,  labs done.  No concerns.).    HPI    Not exercising regularly   Hard to adjust to the second metformin pill she states that that second pill caused a lot of GI distress some days when she did not want to eat and some diarrhea she has adjusted to it now and she feels like she is back to normal   Does a few post dinner  sugars and notes about 140  She has work with a nutritionist to work on her a.m. sugars   Added a bedtime snack that has helped a little   Can't get am sugar under 100  She does not check postprandial breakfast or lunch sugars  She is very motivated to work on her diet she has been trying to increase her exercise  She is a nervous flare she needs a refill on her Xanax    Review of Systems  Review of systems was performed and is otherwise negative except as noted in HPI.      Objective   /72   Pulse 76   Temp 36.5 °C (97.7 °F) (Oral)   Ht 1.575 m (5' 2\")   Wt 58.5 kg (129 lb)   SpO2 95%   BMI 23.59 kg/m²      Physical Exam  HEENT is normal  Lungs clear bilaterally  Heart is regular rate rhythm no murmurs  Abdomen benign  Lower extremities no edema     Assessment/Plan   Diagnoses and all orders for this visit:  Borderline diabetes mellitus  -     SITagliptin phosphate (Januvia) 100 mg tablet; Take 1 tablet (100 mg) by mouth once daily.  -     Lipid Panel; Future  -     Comprehensive Metabolic Panel; Future  Anxiety  -     ALPRAZolam (Xanax) 0.25 mg tablet; 1 to 2 tab po  1 hour before flight  Dyslipidemia  -     metFORMIN XR (Glucophage-XR) 500 mg 24 hr tablet; Take 2 tablets (1,000 mg) by mouth once daily with breakfast. Do not crush, chew, or split.  -     Lipid Panel; Future  -     Comprehensive Metabolic Panel; Future  Fasting hyperglycemia  -     metFORMIN XR (Glucophage-XR) 500 mg 24 hr tablet; Take 2 tablets (1,000 mg) by mouth once daily with breakfast. Do not crush, chew, or " split.  -     Hemoglobin A1C; Future    Will add Januvia  Will hold metformin the same due to tolerance issues  Check blood work in 3 months  Reviewed CMP lipid panel hemoglobin A1c  Refilled meds  I have personally reviewed the [place] report for this patient . It is scanned into the electronic medical record. I have considered the risk of abuse, dependence, addiction and diversion. I believe that it is clinically appropriate for this patient  to be prescribed this medication.      Lorena Tate MD   Statement Selected

## 2025-03-11 ENCOUNTER — APPOINTMENT (OUTPATIENT)
Dept: NUTRITION | Facility: CLINIC | Age: 50
End: 2025-03-11
Payer: COMMERCIAL

## 2025-05-05 DIAGNOSIS — R73.01 FASTING HYPERGLYCEMIA: ICD-10-CM

## 2025-05-05 DIAGNOSIS — R73.03 BORDERLINE DIABETES MELLITUS: ICD-10-CM

## 2025-05-05 DIAGNOSIS — E78.5 DYSLIPIDEMIA: ICD-10-CM

## 2025-05-07 ENCOUNTER — APPOINTMENT (OUTPATIENT)
Dept: PRIMARY CARE | Facility: CLINIC | Age: 50
End: 2025-05-07
Payer: COMMERCIAL

## 2025-05-12 ENCOUNTER — OFFICE VISIT (OUTPATIENT)
Dept: URGENT CARE | Age: 50
End: 2025-05-12
Payer: COMMERCIAL

## 2025-05-12 VITALS
WEIGHT: 130 LBS | DIASTOLIC BLOOD PRESSURE: 83 MMHG | SYSTOLIC BLOOD PRESSURE: 128 MMHG | RESPIRATION RATE: 16 BRPM | HEART RATE: 76 BPM | TEMPERATURE: 97.3 F | BODY MASS INDEX: 23.78 KG/M2 | OXYGEN SATURATION: 98 %

## 2025-05-12 DIAGNOSIS — J01.00 ACUTE NON-RECURRENT MAXILLARY SINUSITIS: Primary | ICD-10-CM

## 2025-05-12 PROBLEM — K21.9 GERD (GASTROESOPHAGEAL REFLUX DISEASE): Status: ACTIVE | Noted: 2025-05-12

## 2025-05-12 PROBLEM — M17.10 ARTHRITIS OF KNEE: Status: ACTIVE | Noted: 2025-05-12

## 2025-05-12 PROBLEM — R79.0 LOW IRON STORES: Status: ACTIVE | Noted: 2025-05-12

## 2025-05-12 PROBLEM — K29.00 ACUTE GASTRITIS WITHOUT BLEEDING: Status: ACTIVE | Noted: 2025-05-12

## 2025-05-12 PROBLEM — I95.1 ORTHOSTATIC HYPOTENSION: Status: ACTIVE | Noted: 2025-05-12

## 2025-05-12 PROBLEM — S83.249A TEAR OF MEDIAL MENISCUS OF KNEE: Status: ACTIVE | Noted: 2022-04-22

## 2025-05-12 PROBLEM — E55.9 VITAMIN D DEFICIENCY: Status: ACTIVE | Noted: 2025-05-12

## 2025-05-12 PROBLEM — R19.8 IRREGULAR BOWEL HABITS: Status: ACTIVE | Noted: 2025-05-12

## 2025-05-12 PROBLEM — C44.90 MALIGNANT NEOPLASM OF SKIN: Status: ACTIVE | Noted: 2025-05-12

## 2025-05-12 PROBLEM — E61.1 IRON DEFICIENCY: Status: ACTIVE | Noted: 2025-05-12

## 2025-05-12 PROBLEM — R11.0 NAUSEA: Status: ACTIVE | Noted: 2025-05-12

## 2025-05-12 PROBLEM — Z86.32 HISTORY OF GESTATIONAL DIABETES MELLITUS (GDM): Status: ACTIVE | Noted: 2025-05-12

## 2025-05-12 PROBLEM — K22.2 ESOPHAGEAL STRICTURE: Status: ACTIVE | Noted: 2025-05-12

## 2025-05-12 PROBLEM — Z86.39 HISTORY OF ENDOCRINE DISORDER: Status: ACTIVE | Noted: 2025-05-12

## 2025-05-12 PROBLEM — E11.9 TYPE 2 DIABETES MELLITUS WITHOUT COMPLICATIONS: Status: ACTIVE | Noted: 2025-05-12

## 2025-05-12 PROBLEM — D50.9 IRON DEFICIENCY ANEMIA: Status: ACTIVE | Noted: 2019-03-04

## 2025-05-12 PROBLEM — K64.9 HEMORRHOIDS WITHOUT COMPLICATION: Status: ACTIVE | Noted: 2025-05-12

## 2025-05-12 PROBLEM — R14.0 ABDOMINAL BLOATING: Status: ACTIVE | Noted: 2025-05-12

## 2025-05-12 PROCEDURE — 3079F DIAST BP 80-89 MM HG: CPT

## 2025-05-12 PROCEDURE — 99203 OFFICE O/P NEW LOW 30 MIN: CPT

## 2025-05-12 PROCEDURE — 3074F SYST BP LT 130 MM HG: CPT

## 2025-05-12 RX ORDER — CYANOCOBALAMIN (VITAMIN B-12) 1000 MCG
1 TABLET, EXTENDED RELEASE ORAL DAILY
COMMUNITY

## 2025-05-12 RX ORDER — FEXOFENADINE HCL AND PSEUDOEPHEDRINE HCL 180; 240 MG/1; MG/1
1 TABLET, EXTENDED RELEASE ORAL DAILY
Qty: 10 TABLET | Refills: 0 | Status: SHIPPED | OUTPATIENT
Start: 2025-05-12 | End: 2025-05-22

## 2025-05-12 RX ORDER — MULTIVITAMIN
TABLET ORAL
COMMUNITY

## 2025-05-12 RX ORDER — FLUTICASONE PROPIONATE 50 MCG
1 SPRAY, SUSPENSION (ML) NASAL DAILY
Qty: 16 G | Refills: 0 | Status: SHIPPED | OUTPATIENT
Start: 2025-05-12 | End: 2026-05-12

## 2025-05-12 RX ORDER — AMOXICILLIN AND CLAVULANATE POTASSIUM 875; 125 MG/1; MG/1
875 TABLET, FILM COATED ORAL 2 TIMES DAILY
Qty: 20 TABLET | Refills: 0 | Status: SHIPPED | OUTPATIENT
Start: 2025-05-12

## 2025-05-12 RX ORDER — ONDANSETRON 4 MG/1
TABLET, FILM COATED ORAL
COMMUNITY

## 2025-05-12 ASSESSMENT — ENCOUNTER SYMPTOMS
COUGH: 0
RHINORRHEA: 1
SINUS PAIN: 1
VOMITING: 0
MYALGIAS: 0
HEADACHES: 1
SINUS PRESSURE: 1
SINUS COMPLAINT: 1
FEVER: 0
DIARRHEA: 0
LOSS OF CONSCIOUSNESS: 0
FATIGUE: 1
NAUSEA: 0
ABDOMINAL PAIN: 0
SHORTNESS OF BREATH: 0
WHEEZING: 0
SORE THROAT: 0

## 2025-05-12 NOTE — PROGRESS NOTES
Subjective   Patient ID: Elizabeth Coto is a 50 y.o. female. They present today with a chief complaint of sinus pressure (Sinus pain, drainage (green), started May 1st).    History of Present Illness  50 year old female presents with complaint of sinus pressure/pain, HA, pain in teeth, fatigue. Symptoms started on May 1st, with a URI. Worsening of sinus sx over the past couple days. Denies fever, dizziness, ear pain nausea, vomiting, diarrhea. Has been taking Advil cold and sinus and Nyquil with mild relief. Works as a pre-.       Sinus Problem  Severity:  Moderate  Onset quality:  Gradual  Duration:  2 weeks  Timing:  Constant  Progression:  Worsening  Chronicity:  New  Associated symptoms: congestion, fatigue, headaches and rhinorrhea    Associated symptoms: no abdominal pain, no chest pain, no cough, no diarrhea, no ear pain, no fever, no loss of consciousness, no myalgias, no nausea, no rash, no shortness of breath, no sore throat, no vomiting and no wheezing        Past Medical History  Allergies as of 05/12/2025    (No Known Allergies)       Prescriptions Prior to Admission[1]     Medical History[2]    Surgical History[3]     reports that she has never smoked. She has never used smokeless tobacco.    Review of Systems  Review of Systems   Constitutional:  Positive for fatigue. Negative for fever.   HENT:  Positive for congestion, postnasal drip, rhinorrhea, sinus pressure and sinus pain. Negative for ear pain and sore throat.    Respiratory:  Negative for cough, shortness of breath and wheezing.    Cardiovascular:  Negative for chest pain.   Gastrointestinal:  Negative for abdominal pain, diarrhea, nausea and vomiting.   Musculoskeletal:  Negative for myalgias.   Skin:  Negative for rash.   Neurological:  Positive for headaches. Negative for loss of consciousness.   All other systems reviewed and are negative.                                 Objective    Vitals:    05/12/25 1338   BP: 128/83    Pulse: 76   Resp: 16   Temp: 36.3 °C (97.3 °F)   SpO2: 98%   Weight: 59 kg (130 lb)     No LMP recorded.    Physical Exam  Vitals and nursing note reviewed.   Constitutional:       Appearance: Normal appearance. She is normal weight.   HENT:      Head: Normocephalic.      Right Ear: Tympanic membrane, ear canal and external ear normal.      Left Ear: Tympanic membrane, ear canal and external ear normal.      Nose: Septal deviation, congestion and rhinorrhea present.      Right Turbinates: Swollen.      Left Turbinates: Swollen.      Right Sinus: Maxillary sinus tenderness present. No frontal sinus tenderness.      Left Sinus: Maxillary sinus tenderness present. No frontal sinus tenderness.      Mouth/Throat:      Mouth: Mucous membranes are moist.      Pharynx: Oropharynx is clear. No oropharyngeal exudate or posterior oropharyngeal erythema.   Eyes:      Conjunctiva/sclera: Conjunctivae normal.      Pupils: Pupils are equal, round, and reactive to light.   Cardiovascular:      Rate and Rhythm: Normal rate and regular rhythm.      Pulses: Normal pulses.      Heart sounds: Normal heart sounds. No murmur heard.  Pulmonary:      Effort: Pulmonary effort is normal. No respiratory distress.      Breath sounds: Normal breath sounds. No wheezing or rhonchi.   Musculoskeletal:         General: Normal range of motion.      Cervical back: Normal range of motion and neck supple.   Lymphadenopathy:      Cervical: No cervical adenopathy.   Skin:     General: Skin is warm.   Neurological:      Mental Status: She is alert and oriented to person, place, and time.   Psychiatric:         Mood and Affect: Mood normal.         Behavior: Behavior normal.         Procedures    Point of Care Test & Imaging Results from this visit  No results found for this visit on 05/12/25.   Imaging  No results found.    Cardiology, Vascular, and Other Imaging  No other imaging results found for the past 2 days      Diagnostic study results (if any)  were reviewed by STEPHANIE Martínez.    Assessment/Plan   Allergies, medications, history, and pertinent labs/EKGs/Imaging reviewed by STEPHANIE Martínez.     Medical Decision Making  Based on history of persistent sinus symptoms, likely bacterial sinusitis. Will start on oral antibiotics today. Encouraged patient to continue symptomatic and supportive care measures. Advised to follow-up with primary care provider if symptoms fail to improve or return to clinic with any new or worsening symptoms. Patient verbalized understanding and agreeable with plan.       Orders and Diagnoses  Diagnoses and all orders for this visit:  Acute non-recurrent maxillary sinusitis  -     fexofenadine-pseudoephedrine (Allegra-D 24) 180-240 mg 24 hr tablet; Take 1 tablet by mouth once daily for 10 days. Do not crush, chew, or split.  -     fluticasone (Flonase) 50 mcg/actuation nasal spray; Administer 1 spray into each nostril once daily. Shake gently. Before first use, prime pump. After use, clean tip and replace cap.  -     amoxicillin-clavulanate (Augmentin) 875-125 mg tablet; Take 1 tablet (875 mg) by mouth 2 times a day.      Medical Admin Record      Patient disposition: Home    Electronically signed by STEPHANIE Martínez  2:03 PM           [1] (Not in a hospital admission)   [2]   Past Medical History:  Diagnosis Date    Personal history of gestational diabetes     History of gestational diabetes    Personal history of other endocrine, nutritional and metabolic disease     History of hypoglycemia   [3]   Past Surgical History:  Procedure Laterality Date    CHOLECYSTECTOMY  02/02/2016    Cholecystectomy    ENDOMETRIAL ABLATION  02/02/2016    [facility] Thermal Endometrial Ablation    OTHER SURGICAL HISTORY  02/11/2019    Colonoscopy    OTHER SURGICAL HISTORY  02/11/2019    Varicose vein ligation    OTHER SURGICAL HISTORY  02/11/2019    Esophagogastroduodenoscopy    OTHER SURGICAL HISTORY   07/18/2022    Endometrial ablation    SKIN CANCER EXCISION  02/02/2016    Mohs Micrographic Surgery Face

## 2025-05-12 NOTE — PATIENT INSTRUCTIONS
Home Care Instructions for Sinusitis  Medications: Take Flonase (nasal spray) and Claritin-D as directed to reduce congestion and inflammation.  Hydration: Drink plenty of fluids to help thin mucus and promote drainage.  Nasal Rinse: Use a saline nasal spray or neti pot to keep nasal passages clear.  Rest: Get adequate rest to help your body fight the infection.  Pain Management: Take Tylenol (acetaminophen) or ibuprofen as needed for pain or fever.  Steam Therapy: Use a humidifier or inhale steam from a bowl of hot water to ease sinus pressure.  Red Flag Symptoms - Seek Medical Attention If:  Symptoms worsen despite treatment or persist beyond 10-14 days.  High fever (>=102°F or 38.9°C).  Facial swelling or severe sinus pain.  Vision changes or severe headache unresponsive to pain medications.  Stiff neck, confusion, or difficulty breathing.  Follow-Up: See your primary care provider if symptoms persist or worsen.

## 2025-05-31 LAB
ALBUMIN SERPL-MCNC: 4.4 G/DL (ref 3.6–5.1)
ALP SERPL-CCNC: 72 U/L (ref 37–153)
ALT SERPL-CCNC: 12 U/L (ref 6–29)
ANION GAP SERPL CALCULATED.4IONS-SCNC: 8 MMOL/L (CALC) (ref 7–17)
AST SERPL-CCNC: 14 U/L (ref 10–35)
BILIRUB SERPL-MCNC: 0.4 MG/DL (ref 0.2–1.2)
BUN SERPL-MCNC: 16 MG/DL (ref 7–25)
CALCIUM SERPL-MCNC: 9.6 MG/DL (ref 8.6–10.4)
CHLORIDE SERPL-SCNC: 103 MMOL/L (ref 98–110)
CHOLEST SERPL-MCNC: 201 MG/DL
CHOLEST/HDLC SERPL: 3.5 (CALC)
CO2 SERPL-SCNC: 26 MMOL/L (ref 20–32)
CREAT SERPL-MCNC: 0.54 MG/DL (ref 0.5–1.03)
EGFRCR SERPLBLD CKD-EPI 2021: 111 ML/MIN/1.73M2
EST. AVERAGE GLUCOSE BLD GHB EST-MCNC: 137 MG/DL
EST. AVERAGE GLUCOSE BLD GHB EST-SCNC: 7.6 MMOL/L
GLUCOSE SERPL-MCNC: 106 MG/DL (ref 65–99)
HBA1C MFR BLD: 6.4 %
HDLC SERPL-MCNC: 58 MG/DL
LDLC SERPL CALC-MCNC: 122 MG/DL (CALC)
NONHDLC SERPL-MCNC: 143 MG/DL (CALC)
POTASSIUM SERPL-SCNC: 4.2 MMOL/L (ref 3.5–5.3)
PROT SERPL-MCNC: 6.9 G/DL (ref 6.1–8.1)
SODIUM SERPL-SCNC: 137 MMOL/L (ref 135–146)
TRIGL SERPL-MCNC: 99 MG/DL

## 2025-06-04 ENCOUNTER — APPOINTMENT (OUTPATIENT)
Dept: PRIMARY CARE | Facility: CLINIC | Age: 50
End: 2025-06-04
Payer: COMMERCIAL

## 2025-06-04 VITALS
HEART RATE: 82 BPM | SYSTOLIC BLOOD PRESSURE: 122 MMHG | HEIGHT: 62 IN | BODY MASS INDEX: 23.92 KG/M2 | OXYGEN SATURATION: 98 % | DIASTOLIC BLOOD PRESSURE: 82 MMHG | WEIGHT: 130 LBS | TEMPERATURE: 98.4 F

## 2025-06-04 DIAGNOSIS — R73.01 FASTING HYPERGLYCEMIA: ICD-10-CM

## 2025-06-04 DIAGNOSIS — E11.65 TYPE 2 DIABETES MELLITUS WITH HYPERGLYCEMIA, WITHOUT LONG-TERM CURRENT USE OF INSULIN: Primary | ICD-10-CM

## 2025-06-04 DIAGNOSIS — F41.9 ANXIETY: ICD-10-CM

## 2025-06-04 DIAGNOSIS — E78.5 DYSLIPIDEMIA: ICD-10-CM

## 2025-06-04 PROCEDURE — 1036F TOBACCO NON-USER: CPT | Performed by: INTERNAL MEDICINE

## 2025-06-04 PROCEDURE — 99214 OFFICE O/P EST MOD 30 MIN: CPT | Performed by: INTERNAL MEDICINE

## 2025-06-04 PROCEDURE — 3079F DIAST BP 80-89 MM HG: CPT | Performed by: INTERNAL MEDICINE

## 2025-06-04 PROCEDURE — 3074F SYST BP LT 130 MM HG: CPT | Performed by: INTERNAL MEDICINE

## 2025-06-04 PROCEDURE — 3008F BODY MASS INDEX DOCD: CPT | Performed by: INTERNAL MEDICINE

## 2025-06-04 RX ORDER — ALPRAZOLAM 0.25 MG/1
TABLET ORAL
Qty: 6 TABLET | Refills: 0 | Status: SHIPPED | OUTPATIENT
Start: 2025-06-04

## 2025-06-04 RX ORDER — LANCETS
EACH MISCELLANEOUS
Qty: 300 EACH | Refills: 0 | Status: SHIPPED | OUTPATIENT
Start: 2025-06-04

## 2025-06-04 RX ORDER — METFORMIN HYDROCHLORIDE 500 MG/1
1000 TABLET, EXTENDED RELEASE ORAL
Qty: 180 TABLET | Refills: 1 | Status: SHIPPED | OUTPATIENT
Start: 2025-06-04 | End: 2025-12-01

## 2025-06-04 RX ORDER — SITAGLIPTIN 100 MG/1
100 TABLET ORAL DAILY
Qty: 90 TABLET | Refills: 1 | Status: SHIPPED | OUTPATIENT
Start: 2025-06-04

## 2025-06-04 RX ORDER — BLOOD SUGAR DIAGNOSTIC
STRIP MISCELLANEOUS
Qty: 300 STRIP | Refills: 1 | Status: SHIPPED | OUTPATIENT
Start: 2025-06-04

## 2025-06-04 RX ORDER — DEXTROSE 4 G
TABLET,CHEWABLE ORAL
Qty: 1 EACH | Refills: 0 | Status: SHIPPED | OUTPATIENT
Start: 2025-06-04

## 2025-06-04 ASSESSMENT — ENCOUNTER SYMPTOMS
LOSS OF SENSATION IN FEET: 0
OCCASIONAL FEELINGS OF UNSTEADINESS: 0
DEPRESSION: 0

## 2025-06-04 NOTE — PROGRESS NOTES
"Subjective   Patient ID: Elizabeth Coto is a 50 y.o. female who presents for Follow-up (EP.  Follow up DMII.  Labs done. No concerns.).  HPI    History of Present Illness  Elizabeth Coto is a 50 year old female with type 2 diabetes who presents for medication management and glucose monitoring.    She has been managing her type 2 diabetes with metformin but is unable to afford Januvia due to its high cost. She is concerned about gastrointestinal side effects if the metformin dose is increased to the maximum of four pills a day. She has been exploring alternative medications with her pharmacist and mentioned a medication called Zituvio.    Her blood glucose levels and A1c have increased. She tests her blood sugar multiple times a day using Accu-Chek test strips but has difficulty identifying when her blood sugar spikes occur. She has encountered issues with insurance coverage for these supplies but found a potential solution through [employer] for free supplies, requiring a prescription for Accu-Chek products.    She reports an increase in cholesterol levels and wonders if it could be related to hormonal changes. She follows a vegetarian diet and uses a meal kit service. Her physical activity has decreased over the past three months due to a stomach virus, but she plans to increase her exercise now that she is off for the summer.    She experiences hot flashes and sleep disturbances, which she attributes to perimenopause. She takes paroxetine at bedtime to manage these symptoms but notes it starts to wear off in the evening. She has been dealing with temperature control issues in her environment.    Review of Systems  Review of systems was performed and is otherwise negative except as noted in HPI.      Objective   /82   Pulse 82   Temp 36.9 °C (98.4 °F) (Oral)   Ht 1.575 m (5' 2\")   Wt 59 kg (130 lb)   SpO2 98%   BMI 23.78 kg/m²      Physical Exam  HEENT is normal  Lungs clear bilaterally  Heart is " regular rate rhythm no murmurs  Abdomen benign  Lower extremities no edema     Assessment/Plan   Diagnoses and all orders for this visit:  Type 2 diabetes mellitus with hyperglycemia, without long-term current use of insulin  -     SITagliptin 100 mg tablet; Take 100 mg by mouth once daily.  -     blood sugar diagnostic (Accu-Chek Guide test strips); To test tid as needed for rising hba1c dx E11.65 non insulin dependent  -     lancets misc; To test tid  dx e11.65 noninsulin dependent  -     blood-glucose meter (Accu-Chek Guide Glucose Meter) misc; Use to test tid dx e11.65 non insulin dependent  -     Hemoglobin A1C; Future  Dyslipidemia  -     metFORMIN XR (Glucophage-XR) 500 mg 24 hr tablet; Take 2 tablets (1,000 mg) by mouth once daily with breakfast. Do not crush, chew, or split.  -     Lipid Panel; Future  -     Comprehensive Metabolic Panel; Future  Fasting hyperglycemia  -     metFORMIN XR (Glucophage-XR) 500 mg 24 hr tablet; Take 2 tablets (1,000 mg) by mouth once daily with breakfast. Do not crush, chew, or split.  -     SITagliptin 100 mg tablet; Take 100 mg by mouth once daily.  -     blood sugar diagnostic (Accu-Chek Guide test strips); To test tid as needed for rising hba1c dx E11.65 non insulin dependent  -     lancets misc; To test tid  dx e11.65 noninsulin dependent  -     blood-glucose meter (Accu-Chek Guide Glucose Meter) misc; Use to test tid dx e11.65 non insulin dependent  -     Hemoglobin A1C; Future  Anxiety  -     ALPRAZolam (Xanax) 0.25 mg tablet; 1 to 2 tab po  1 hour before flight    Assessment & Plan  Type 2 Diabetes Mellitus  Type 2 Diabetes Mellitus with suboptimal control. Current regimen includes Metformin  mg daily. Unable to afford Januvia (sitagliptin) due to high cost. Experiencing difficulty in identifying glucose patterns, possibly due to postprandial hyperglycemia. Discussed the use of Freestyle Moni 3 continuous glucose monitor and potential dietary influences on  glucose levels.  - Prescribe sitagliptin (Zituvio) as an affordable alternative to Januvia.  - Send prescription for Metformin XR to [facility] for mail order.  - Provide prescription for Accu-Chek guide test strips and lancets.  - Arrange for use of Freestyle Moni 3 continuous glucose monitor for short-term use to identify glucose patterns.  - Consider referral to a nutritionist if dietary influences on glucose levels are suspected.    Fasting Hyperglycemia  Fasting hyperglycemia with difficulty identifying patterns. Current fasting glucose levels are around 102 mg/dL. Potential postprandial hyperglycemia contributing to overall glucose control issues.  - Utilize Freestyle Moni 3 continuous glucose monitor to identify glucose patterns and potential postprandial hyperglycemia.  - Provide prescription for Accu-Chek guide test strips and lancets for regular monitoring.    Dyslipidemia  Dyslipidemia with recent increase in cholesterol levels. Vegetarian diet with meal kit service. Possible influence of perimenopausal changes on lipid levels. Discussed the potential need for pharmacological intervention if dietary changes are insufficient.  - Recommend consultation with a nutritionist to evaluate dietary influences on cholesterol levels.  - Consider pharmacological intervention if dietary changes are insufficient.    Anxiety  Anxiety managed with alprazolam for situational use. Reports stress related to personal circumstances.  - Send prescription for alprazolam to [facility] for local pickup.    FU 3 mos bw before       Lorena Tate MD  This medical note was created with the assistance of artificial intelligence (AI) for documentation purposes. The content has been reviewed and confirmed by the healthcare provider for accuracy and completeness. Patient consented to the use of audio recording and use of AI during their visit.

## 2025-07-29 ENCOUNTER — APPOINTMENT (OUTPATIENT)
Dept: NUTRITION | Facility: CLINIC | Age: 50
End: 2025-07-29
Payer: COMMERCIAL

## 2025-09-16 ENCOUNTER — APPOINTMENT (OUTPATIENT)
Dept: PRIMARY CARE | Facility: CLINIC | Age: 50
End: 2025-09-16
Payer: COMMERCIAL

## 2025-12-12 ENCOUNTER — APPOINTMENT (OUTPATIENT)
Dept: PRIMARY CARE | Facility: CLINIC | Age: 50
End: 2025-12-12
Payer: COMMERCIAL

## 2026-03-06 ENCOUNTER — APPOINTMENT (OUTPATIENT)
Dept: PRIMARY CARE | Facility: CLINIC | Age: 51
End: 2026-03-06
Payer: COMMERCIAL

## 2026-03-13 ENCOUNTER — APPOINTMENT (OUTPATIENT)
Dept: PRIMARY CARE | Facility: CLINIC | Age: 51
End: 2026-03-13
Payer: COMMERCIAL